# Patient Record
Sex: FEMALE | Race: WHITE | NOT HISPANIC OR LATINO | Employment: OTHER | URBAN - METROPOLITAN AREA
[De-identification: names, ages, dates, MRNs, and addresses within clinical notes are randomized per-mention and may not be internally consistent; named-entity substitution may affect disease eponyms.]

---

## 2019-02-22 ENCOUNTER — HOSPITAL ENCOUNTER (OUTPATIENT)
Dept: RADIOLOGY | Facility: MEDICAL CENTER | Age: 84
End: 2019-02-22

## 2019-02-22 ENCOUNTER — HOSPITAL ENCOUNTER (INPATIENT)
Facility: MEDICAL CENTER | Age: 84
LOS: 10 days | DRG: 083 | End: 2019-03-04
Attending: SURGERY | Admitting: SURGERY
Payer: MEDICARE

## 2019-02-22 ENCOUNTER — APPOINTMENT (OUTPATIENT)
Dept: RADIOLOGY | Facility: MEDICAL CENTER | Age: 84
DRG: 083 | End: 2019-02-22
Attending: NEUROLOGICAL SURGERY
Payer: MEDICARE

## 2019-02-22 DIAGNOSIS — I62.9 ICB (INTRACRANIAL BLEED) (HCC): ICD-10-CM

## 2019-02-22 DIAGNOSIS — Z79.01 CHRONIC ANTICOAGULATION: ICD-10-CM

## 2019-02-22 DIAGNOSIS — S01.01XA LACERATION OF SCALP, INITIAL ENCOUNTER: ICD-10-CM

## 2019-02-22 DIAGNOSIS — T14.90XA TRAUMA: ICD-10-CM

## 2019-02-22 DIAGNOSIS — S06.9X0A TRAUMATIC BRAIN INJURY, WITHOUT LOSS OF CONSCIOUSNESS, INITIAL ENCOUNTER (HCC): ICD-10-CM

## 2019-02-22 DIAGNOSIS — S12.101A CLOSED NONDISPLACED FRACTURE OF SECOND CERVICAL VERTEBRA, UNSPECIFIED FRACTURE MORPHOLOGY, INITIAL ENCOUNTER (HCC): ICD-10-CM

## 2019-02-22 PROBLEM — Z53.09 CONTRAINDICATION TO DEEP VEIN THROMBOSIS (DVT) PROPHYLAXIS: Status: ACTIVE | Noted: 2019-02-22

## 2019-02-22 PROBLEM — S12.100A FRACTURE OF SECOND CERVICAL VERTEBRA (HCC): Status: ACTIVE | Noted: 2019-02-22

## 2019-02-22 PROBLEM — S06.9XAA TRAUMATIC BRAIN INJURY (HCC): Status: ACTIVE | Noted: 2019-02-22

## 2019-02-22 PROBLEM — D69.59 PLATELET DYSFUNCTION DUE TO DRUGS: Status: ACTIVE | Noted: 2019-02-22

## 2019-02-22 PROBLEM — T50.905A PLATELET DYSFUNCTION DUE TO DRUGS: Status: ACTIVE | Noted: 2019-02-22

## 2019-02-22 LAB
ABO GROUP BLD: NORMAL
ALBUMIN SERPL BCP-MCNC: 3.6 G/DL (ref 3.2–4.9)
ALBUMIN/GLOB SERPL: 1.1 G/DL
ALP SERPL-CCNC: 59 U/L (ref 30–99)
ALT SERPL-CCNC: 7 U/L (ref 2–50)
ANION GAP SERPL CALC-SCNC: 9 MMOL/L (ref 0–11.9)
APTT PPP: 26.4 SEC (ref 24.7–36)
AST SERPL-CCNC: 23 U/L (ref 12–45)
BARCODED ABORH UBTYP: 5100
BARCODED PRD CODE UBPRD: NORMAL
BARCODED UNIT NUM UBUNT: NORMAL
BILIRUB SERPL-MCNC: 0.5 MG/DL (ref 0.1–1.5)
BLD GP AB SCN SERPL QL: NORMAL
BUN SERPL-MCNC: 18 MG/DL (ref 8–22)
CALCIUM SERPL-MCNC: 8.8 MG/DL (ref 8.5–10.5)
CFT BLD TEG: 3.8 MIN (ref 5–10)
CHLORIDE SERPL-SCNC: 109 MMOL/L (ref 96–112)
CLOT ANGLE BLD TEG: 71.8 DEGREES (ref 53–72)
CLOT LYSIS 30M P MA LENFR BLD TEG: 0 % (ref 0–8)
CO2 SERPL-SCNC: 23 MMOL/L (ref 20–33)
COMPONENT P 8504P: NORMAL
CREAT SERPL-MCNC: 1.01 MG/DL (ref 0.5–1.4)
CT.EXTRINSIC BLD ROTEM: 1.3 MIN (ref 1–3)
ERYTHROCYTE [DISTWIDTH] IN BLOOD BY AUTOMATED COUNT: 50.4 FL (ref 35.9–50)
ETHANOL BLD-MCNC: 0.01 G/DL
GLOBULIN SER CALC-MCNC: 3.2 G/DL (ref 1.9–3.5)
GLUCOSE SERPL-MCNC: 101 MG/DL (ref 65–99)
HCT VFR BLD AUTO: 39.9 % (ref 37–47)
HGB BLD-MCNC: 12.7 G/DL (ref 12–16)
INR PPP: 1.14 (ref 0.87–1.13)
MCF BLD TEG: 68.8 MM (ref 50–70)
MCH RBC QN AUTO: 30.7 PG (ref 27–33)
MCHC RBC AUTO-ENTMCNC: 31.8 G/DL (ref 33.6–35)
MCV RBC AUTO: 96.4 FL (ref 81.4–97.8)
PA AA BLD-ACNC: 100 %
PA ADP BLD-ACNC: 90.8 %
PLATELET # BLD AUTO: 134 K/UL (ref 164–446)
PMV BLD AUTO: 11.9 FL (ref 9–12.9)
POTASSIUM SERPL-SCNC: 3.5 MMOL/L (ref 3.6–5.5)
PRODUCT TYPE UPROD: NORMAL
PROT SERPL-MCNC: 6.8 G/DL (ref 6–8.2)
PROTHROMBIN TIME: 14.8 SEC (ref 12–14.6)
RBC # BLD AUTO: 4.14 M/UL (ref 4.2–5.4)
RH BLD: NORMAL
SODIUM SERPL-SCNC: 141 MMOL/L (ref 135–145)
TEG ALGORITHM TGALG: ABNORMAL
UNIT STATUS USTAT: NORMAL
WBC # BLD AUTO: 6.7 K/UL (ref 4.8–10.8)

## 2019-02-22 PROCEDURE — 86900 BLOOD TYPING SEROLOGIC ABO: CPT

## 2019-02-22 PROCEDURE — 96374 THER/PROPH/DIAG INJ IV PUSH: CPT

## 2019-02-22 PROCEDURE — 36430 TRANSFUSION BLD/BLD COMPNT: CPT

## 2019-02-22 PROCEDURE — 80307 DRUG TEST PRSMV CHEM ANLYZR: CPT

## 2019-02-22 PROCEDURE — G0390 TRAUMA RESPONS W/HOSP CRITI: HCPCS

## 2019-02-22 PROCEDURE — P9034 PLATELETS, PHERESIS: HCPCS

## 2019-02-22 PROCEDURE — 700111 HCHG RX REV CODE 636 W/ 250 OVERRIDE (IP): Performed by: NEUROLOGICAL SURGERY

## 2019-02-22 PROCEDURE — 700111 HCHG RX REV CODE 636 W/ 250 OVERRIDE (IP): Performed by: SURGERY

## 2019-02-22 PROCEDURE — 80053 COMPREHEN METABOLIC PANEL: CPT

## 2019-02-22 PROCEDURE — 99291 CRITICAL CARE FIRST HOUR: CPT

## 2019-02-22 PROCEDURE — 85610 PROTHROMBIN TIME: CPT

## 2019-02-22 PROCEDURE — 70450 CT HEAD/BRAIN W/O DYE: CPT

## 2019-02-22 PROCEDURE — 85027 COMPLETE CBC AUTOMATED: CPT

## 2019-02-22 PROCEDURE — 306560 TORSO SUPPORT,POSEY RESTR LG: Performed by: SURGERY

## 2019-02-22 PROCEDURE — 85384 FIBRINOGEN ACTIVITY: CPT

## 2019-02-22 PROCEDURE — 85576 BLOOD PLATELET AGGREGATION: CPT | Mod: 91

## 2019-02-22 PROCEDURE — 85730 THROMBOPLASTIN TIME PARTIAL: CPT

## 2019-02-22 PROCEDURE — 85347 COAGULATION TIME ACTIVATED: CPT

## 2019-02-22 PROCEDURE — 700105 HCHG RX REV CODE 258: Performed by: NEUROLOGICAL SURGERY

## 2019-02-22 PROCEDURE — 700105 HCHG RX REV CODE 258: Performed by: SURGERY

## 2019-02-22 PROCEDURE — 86850 RBC ANTIBODY SCREEN: CPT

## 2019-02-22 PROCEDURE — 86901 BLOOD TYPING SEROLOGIC RH(D): CPT

## 2019-02-22 PROCEDURE — 30233R1 TRANSFUSION OF NONAUTOLOGOUS PLATELETS INTO PERIPHERAL VEIN, PERCUTANEOUS APPROACH: ICD-10-PCS | Performed by: SURGERY

## 2019-02-22 PROCEDURE — 94760 N-INVAS EAR/PLS OXIMETRY 1: CPT

## 2019-02-22 PROCEDURE — 770022 HCHG ROOM/CARE - ICU (200)

## 2019-02-22 RX ORDER — POLYETHYLENE GLYCOL 3350 17 G/17G
1 POWDER, FOR SOLUTION ORAL 2 TIMES DAILY
Status: DISCONTINUED | OUTPATIENT
Start: 2019-02-22 | End: 2019-02-27

## 2019-02-22 RX ORDER — LORAZEPAM 2 MG/ML
1-2 INJECTION INTRAMUSCULAR
Status: DISCONTINUED | OUTPATIENT
Start: 2019-02-22 | End: 2019-02-23

## 2019-02-22 RX ORDER — ONDANSETRON 2 MG/ML
4 INJECTION INTRAMUSCULAR; INTRAVENOUS EVERY 4 HOURS PRN
Status: DISCONTINUED | OUTPATIENT
Start: 2019-02-22 | End: 2019-03-04 | Stop reason: HOSPADM

## 2019-02-22 RX ORDER — LORAZEPAM 2 MG/ML
INJECTION INTRAMUSCULAR
Status: COMPLETED | OUTPATIENT
Start: 2019-02-22 | End: 2019-02-22

## 2019-02-22 RX ORDER — ENEMA 19; 7 G/133ML; G/133ML
1 ENEMA RECTAL
Status: DISCONTINUED | OUTPATIENT
Start: 2019-02-22 | End: 2019-03-04 | Stop reason: HOSPADM

## 2019-02-22 RX ORDER — FAMOTIDINE 20 MG/1
20 TABLET, FILM COATED ORAL DAILY
Status: DISCONTINUED | OUTPATIENT
Start: 2019-02-23 | End: 2019-02-27

## 2019-02-22 RX ORDER — DOCUSATE SODIUM 100 MG/1
100 CAPSULE, LIQUID FILLED ORAL 2 TIMES DAILY
Status: DISCONTINUED | OUTPATIENT
Start: 2019-02-22 | End: 2019-02-26

## 2019-02-22 RX ORDER — BISACODYL 10 MG
10 SUPPOSITORY, RECTAL RECTAL
Status: DISCONTINUED | OUTPATIENT
Start: 2019-02-22 | End: 2019-03-04 | Stop reason: HOSPADM

## 2019-02-22 RX ORDER — AMOXICILLIN 250 MG
1 CAPSULE ORAL
Status: DISCONTINUED | OUTPATIENT
Start: 2019-02-22 | End: 2019-03-04 | Stop reason: HOSPADM

## 2019-02-22 RX ORDER — AMOXICILLIN 250 MG
1 CAPSULE ORAL NIGHTLY
Status: DISCONTINUED | OUTPATIENT
Start: 2019-02-22 | End: 2019-02-27

## 2019-02-22 RX ORDER — LORAZEPAM 2 MG/ML
3-4 INJECTION INTRAMUSCULAR
Status: DISCONTINUED | OUTPATIENT
Start: 2019-02-22 | End: 2019-02-23

## 2019-02-22 RX ORDER — MORPHINE SULFATE 4 MG/ML
4 INJECTION, SOLUTION INTRAMUSCULAR; INTRAVENOUS
Status: DISCONTINUED | OUTPATIENT
Start: 2019-02-22 | End: 2019-02-23

## 2019-02-22 RX ORDER — SODIUM CHLORIDE, SODIUM LACTATE, POTASSIUM CHLORIDE, CALCIUM CHLORIDE 600; 310; 30; 20 MG/100ML; MG/100ML; MG/100ML; MG/100ML
INJECTION, SOLUTION INTRAVENOUS CONTINUOUS
Status: DISCONTINUED | OUTPATIENT
Start: 2019-02-22 | End: 2019-02-25

## 2019-02-22 RX ADMIN — SODIUM CHLORIDE 500 MG: 9 INJECTION, SOLUTION INTRAVENOUS at 21:55

## 2019-02-22 RX ADMIN — LORAZEPAM 0.5 MG: 2 INJECTION INTRAMUSCULAR; INTRAVENOUS at 19:46

## 2019-02-22 RX ADMIN — SODIUM CHLORIDE, POTASSIUM CHLORIDE, SODIUM LACTATE AND CALCIUM CHLORIDE: 600; 310; 30; 20 INJECTION, SOLUTION INTRAVENOUS at 21:55

## 2019-02-22 RX ADMIN — LORAZEPAM 3 MG: 2 INJECTION INTRAMUSCULAR; INTRAVENOUS at 21:05

## 2019-02-23 ENCOUNTER — APPOINTMENT (OUTPATIENT)
Dept: RADIOLOGY | Facility: MEDICAL CENTER | Age: 84
DRG: 083 | End: 2019-02-23
Attending: NEUROLOGICAL SURGERY
Payer: MEDICARE

## 2019-02-23 PROBLEM — Z51.5 PALLIATIVE CARE ENCOUNTER: Status: ACTIVE | Noted: 2019-02-23

## 2019-02-23 PROBLEM — Z01.89 ENCOUNTER FOR GERIATRIC ASSESSMENT: Status: ACTIVE | Noted: 2019-02-23

## 2019-02-23 PROBLEM — Z82.0 FAMILY HISTORY OF ALZHEIMER'S DISEASE: Status: ACTIVE | Noted: 2019-02-23

## 2019-02-23 LAB
ABO GROUP BLD: NORMAL
ALBUMIN SERPL BCP-MCNC: 3.9 G/DL (ref 3.2–4.9)
ALBUMIN/GLOB SERPL: 1.7 G/DL
ALP SERPL-CCNC: 63 U/L (ref 30–99)
ALT SERPL-CCNC: 10 U/L (ref 2–50)
ANION GAP SERPL CALC-SCNC: 5 MMOL/L (ref 0–11.9)
AST SERPL-CCNC: 27 U/L (ref 12–45)
BASOPHILS # BLD AUTO: 0.4 % (ref 0–1.8)
BASOPHILS # BLD: 0.03 K/UL (ref 0–0.12)
BILIRUB SERPL-MCNC: 1.1 MG/DL (ref 0.1–1.5)
BUN SERPL-MCNC: 18 MG/DL (ref 8–22)
CALCIUM SERPL-MCNC: 9.7 MG/DL (ref 8.5–10.5)
CFT BLD TEG: 4 MIN (ref 5–10)
CHLORIDE SERPL-SCNC: 106 MMOL/L (ref 96–112)
CLOT ANGLE BLD TEG: 72.5 DEGREES (ref 53–72)
CLOT LYSIS 30M P MA LENFR BLD TEG: 7.1 % (ref 0–8)
CO2 SERPL-SCNC: 24 MMOL/L (ref 20–33)
CREAT SERPL-MCNC: 1.03 MG/DL (ref 0.5–1.4)
CT.EXTRINSIC BLD ROTEM: 1.2 MIN (ref 1–3)
EOSINOPHIL # BLD AUTO: 0 K/UL (ref 0–0.51)
EOSINOPHIL NFR BLD: 0 % (ref 0–6.9)
ERYTHROCYTE [DISTWIDTH] IN BLOOD BY AUTOMATED COUNT: 49.1 FL (ref 35.9–50)
GLOBULIN SER CALC-MCNC: 2.3 G/DL (ref 1.9–3.5)
GLUCOSE SERPL-MCNC: 117 MG/DL (ref 65–99)
HCT VFR BLD AUTO: 32.7 % (ref 37–47)
HGB BLD-MCNC: 10.7 G/DL (ref 12–16)
IMM GRANULOCYTES # BLD AUTO: 0.03 K/UL (ref 0–0.11)
IMM GRANULOCYTES NFR BLD AUTO: 0.4 % (ref 0–0.9)
LYMPHOCYTES # BLD AUTO: 1.48 K/UL (ref 1–4.8)
LYMPHOCYTES NFR BLD: 17.8 % (ref 22–41)
MCF BLD TEG: 67.9 MM (ref 50–70)
MCH RBC QN AUTO: 31.1 PG (ref 27–33)
MCHC RBC AUTO-ENTMCNC: 32.7 G/DL (ref 33.6–35)
MCV RBC AUTO: 95.1 FL (ref 81.4–97.8)
MONOCYTES # BLD AUTO: 0.96 K/UL (ref 0–0.85)
MONOCYTES NFR BLD AUTO: 11.5 % (ref 0–13.4)
NEUTROPHILS # BLD AUTO: 5.82 K/UL (ref 2–7.15)
NEUTROPHILS NFR BLD: 69.9 % (ref 44–72)
NRBC # BLD AUTO: 0 K/UL
NRBC BLD-RTO: 0 /100 WBC
PA AA BLD-ACNC: 88.8 %
PA ADP BLD-ACNC: 55.8 %
PLATELET # BLD AUTO: 150 K/UL (ref 164–446)
PMV BLD AUTO: 11.9 FL (ref 9–12.9)
POTASSIUM SERPL-SCNC: 3.7 MMOL/L (ref 3.6–5.5)
PROT SERPL-MCNC: 6.2 G/DL (ref 6–8.2)
RBC # BLD AUTO: 3.44 M/UL (ref 4.2–5.4)
RH BLD: NORMAL
SODIUM SERPL-SCNC: 135 MMOL/L (ref 135–145)
TEG ALGORITHM TGALG: ABNORMAL
WBC # BLD AUTO: 8.3 K/UL (ref 4.8–10.8)

## 2019-02-23 PROCEDURE — 85347 COAGULATION TIME ACTIVATED: CPT

## 2019-02-23 PROCEDURE — 770001 HCHG ROOM/CARE - MED/SURG/GYN PRIV*

## 2019-02-23 PROCEDURE — 90471 IMMUNIZATION ADMIN: CPT

## 2019-02-23 PROCEDURE — A9270 NON-COVERED ITEM OR SERVICE: HCPCS | Performed by: NURSE PRACTITIONER

## 2019-02-23 PROCEDURE — 700105 HCHG RX REV CODE 258: Performed by: NEUROLOGICAL SURGERY

## 2019-02-23 PROCEDURE — 90670 PCV13 VACCINE IM: CPT | Performed by: SURGERY

## 2019-02-23 PROCEDURE — 85025 COMPLETE CBC W/AUTO DIFF WBC: CPT

## 2019-02-23 PROCEDURE — 700105 HCHG RX REV CODE 258: Performed by: SURGERY

## 2019-02-23 PROCEDURE — 85384 FIBRINOGEN ACTIVITY: CPT

## 2019-02-23 PROCEDURE — 70450 CT HEAD/BRAIN W/O DYE: CPT

## 2019-02-23 PROCEDURE — 700111 HCHG RX REV CODE 636 W/ 250 OVERRIDE (IP): Performed by: NEUROLOGICAL SURGERY

## 2019-02-23 PROCEDURE — 700102 HCHG RX REV CODE 250 W/ 637 OVERRIDE(OP): Performed by: NURSE PRACTITIONER

## 2019-02-23 PROCEDURE — 700111 HCHG RX REV CODE 636 W/ 250 OVERRIDE (IP): Performed by: SURGERY

## 2019-02-23 PROCEDURE — 85576 BLOOD PLATELET AGGREGATION: CPT | Mod: 91

## 2019-02-23 PROCEDURE — 99233 SBSQ HOSP IP/OBS HIGH 50: CPT | Performed by: SURGERY

## 2019-02-23 PROCEDURE — 80053 COMPREHEN METABOLIC PANEL: CPT

## 2019-02-23 RX ORDER — ACETAMINOPHEN 325 MG/1
650 TABLET ORAL EVERY 4 HOURS PRN
Status: DISCONTINUED | OUTPATIENT
Start: 2019-02-23 | End: 2019-02-26

## 2019-02-23 RX ORDER — LOSARTAN POTASSIUM 50 MG/1
100 TABLET ORAL
Status: DISCONTINUED | OUTPATIENT
Start: 2019-02-23 | End: 2019-03-04 | Stop reason: HOSPADM

## 2019-02-23 RX ORDER — HYDROCHLOROTHIAZIDE 25 MG/1
12.5 TABLET ORAL
Status: DISCONTINUED | OUTPATIENT
Start: 2019-02-23 | End: 2019-03-04 | Stop reason: HOSPADM

## 2019-02-23 RX ORDER — LOSARTAN POTASSIUM AND HYDROCHLOROTHIAZIDE 12.5; 1 MG/1; MG/1
1 TABLET ORAL DAILY
Status: DISCONTINUED | OUTPATIENT
Start: 2019-02-23 | End: 2019-02-23

## 2019-02-23 RX ADMIN — FAMOTIDINE 20 MG: 10 INJECTION INTRAVENOUS at 06:08

## 2019-02-23 RX ADMIN — LOSARTAN POTASSIUM 100 MG: 50 TABLET ORAL at 14:33

## 2019-02-23 RX ADMIN — LORAZEPAM 2 MG: 2 INJECTION INTRAMUSCULAR; INTRAVENOUS at 04:03

## 2019-02-23 RX ADMIN — SODIUM CHLORIDE 500 MG: 9 INJECTION, SOLUTION INTRAVENOUS at 17:20

## 2019-02-23 RX ADMIN — SODIUM CHLORIDE 500 MG: 9 INJECTION, SOLUTION INTRAVENOUS at 06:11

## 2019-02-23 RX ADMIN — HYDROCHLOROTHIAZIDE 12.5 MG: 25 TABLET ORAL at 14:33

## 2019-02-23 RX ADMIN — SODIUM CHLORIDE, POTASSIUM CHLORIDE, SODIUM LACTATE AND CALCIUM CHLORIDE: 600; 310; 30; 20 INJECTION, SOLUTION INTRAVENOUS at 12:28

## 2019-02-23 RX ADMIN — PNEUMOCOCCAL 13-VALENT CONJUGATE VACCINE 0.5 ML: 2.2; 2.2; 2.2; 2.2; 2.2; 4.4; 2.2; 2.2; 2.2; 2.2; 2.2; 2.2; 2.2 INJECTION, SUSPENSION INTRAMUSCULAR at 17:21

## 2019-02-23 ASSESSMENT — COGNITIVE AND FUNCTIONAL STATUS - GENERAL
MOVING FROM LYING ON BACK TO SITTING ON SIDE OF FLAT BED: A LOT
MOBILITY SCORE: 13
DAILY ACTIVITIY SCORE: 12
TURNING FROM BACK TO SIDE WHILE IN FLAT BAD: A LITTLE
SUGGESTED CMS G CODE MODIFIER DAILY ACTIVITY: CL
DRESSING REGULAR LOWER BODY CLOTHING: A LOT
WALKING IN HOSPITAL ROOM: A LOT
MOVING TO AND FROM BED TO CHAIR: A LOT
DRESSING REGULAR UPPER BODY CLOTHING: A LOT
SUGGESTED CMS G CODE MODIFIER MOBILITY: CL
PERSONAL GROOMING: A LOT
STANDING UP FROM CHAIR USING ARMS: A LOT
EATING MEALS: A LOT
HELP NEEDED FOR BATHING: A LOT
TOILETING: A LOT
CLIMB 3 TO 5 STEPS WITH RAILING: A LOT

## 2019-02-23 ASSESSMENT — LIFESTYLE VARIABLES
EVER_SMOKED: NEVER
ALCOHOL_USE: NO

## 2019-02-23 NOTE — ASSESSMENT & PLAN NOTE
Ground level fall, unknown LOC.  Trauma Yellow Transfer Activation.  Smiley Collins MD. Trauma Surgery.

## 2019-02-23 NOTE — ASSESSMENT & PLAN NOTE
Nondisplaced oblique fracture mid dens of C2.  Non-operative management.   Aspen collar on at all times for 6 weeks.  Andrea Alejo MD. Neurosurgery.

## 2019-02-23 NOTE — CARE PLAN
Problem: Neuro Status  Goal: Monitor neuro status and rapid identification of neuro changes  Assessing patient neuro status per ordered, per family patient's baseline is poor due to Alzheimer's.

## 2019-02-23 NOTE — H&P
IDENTIFICATION:  An 87-year-old female.    HISTORY OF PRESENT ILLNESS:  The patient apparently lives at home with her   family and fell at home and had a mental status change.  She subsequently was   taken to the emergency room at [facility], she was found   to have a small subdural hematoma as well as a fracture through the base of   C2.  She was subsequently transferred to [facility] as a trauma   green.    PAST MEDICAL HISTORY:  Illnesses are hypertension and rheumatoid arthritis.    PAST SURGICAL HISTORY:  Hysterectomy.    MEDICATIONS:  Prolia, Atarax, Hyzaar and Plavix.    ALLERGIES:  TO SULFA.    SOCIAL HISTORY:  Unobtainable.    REVIEW OF SYSTEMS:  Unobtainable.    PHYSICAL EXAMINATION:  VITAL SIGNS:  Her blood pressure 160/70, heart rate in the 90s.  GENERAL:  She is alert, but not cooperative and is agitated.  HEENT:  Her head is without any evidence of obvious trauma.  A 2 cm laceration   on the right lateral scalp that has been closed with staples.  NECK:  In a C-collar.  Trachea is midline.  LUNGS:  Clear.  HEART:  Regular rate and rhythm.  ABDOMEN:  Soft.  PELVIS:  Stable.  EXTREMITIES:  Moving all extremities.  NEUROLOGIC:  She has a GCS of 13.    IMAGING:  Head CT demonstrated her to have a small subdural hematoma on the   left side.  She also has a C2 ____ fracture.    IMPRESSION:  An 87-year-old female with left subdural hematoma and a dens   fracture.    PLAN:  She will be admitted to the ICU.  Dr. Alejo from neurosurgery will   see her in regards to her cervical fracture as well as her subdural hematoma.    Since she is on Plavix, we will check a TEG and correct any coagulopathy that   may be noted.  She will be in a C collar and will do a followup head CT in   approximately 6 hours.  She will start on Keppra and will do serial neurologic   examinations.       ____________________________________     MD KENYA DICKERSON / LATASHA    DD:  02/22/2019  20:57:21  DT:  02/22/2019 23:55:49    D#:  [id number]  Job#:  [id number]

## 2019-02-23 NOTE — PROGRESS NOTES
2303: Called daughter Damon, left voicemail.  Called daughter Saundra and discussed consent for platlet transfusion. Saundra will call back once talking with oldest sibling Damon.  2308: 2 RN telephone consent recieved

## 2019-02-23 NOTE — ASSESSMENT & PLAN NOTE
Premorbid.  Lives with daughter.  Ambulates with walker, has fallen a couple of times due to unsteady gait, able to complete all self care activities.  Normalize sleep/wake cycles.

## 2019-02-23 NOTE — ASSESSMENT & PLAN NOTE
Systemic anticoagulation contraindicated secondary to elevated bleeding risk.  RAP score 11.  2/23 Surveillance venous duplex scanning ordered.  2/24 Trauma screening bilateral lower extremity venous duplex negative for above knee DVT.

## 2019-02-23 NOTE — ED PROVIDER NOTES
ED Provider Note    HPI: Patient is an 87-year-old female who presented to the emergency department by ambulance transfer February 22, 2019 at 7:23 PM with a chief complaint of a head injury.    Patient fell at home and hit her head.  She presented to another facility where CT imaging showed a nondisplaced C2 fracture as well as an intracranial bleed.  The other facility did not have the capability to care for this problem and the patient was transferred here.  Patient has significant dementia.  She really was not able to cooperate very much with history giving.  As best as I can determine she is at her baseline in terms of mental status.  No one has arrived yet to assist with history per    Review of Systems: Cannot be obtained due to presenting condition    Past medical/surgical history: Per transfer paperwork hypertension rheumatoid arthritis hysterectomy.  The patient is also on anticoagulation but I do not know why and the patient cannot tell me    Medications: Prolia Atarax Hyzaar Plavix    Allergies: Sulfa    Social History: Unknown cannot be obtained due to presenting condition      Physical exam: Constitutional: Elderly female somewhat agitated  Vital signs: Temperature 99.0 pulse 94 respirations 16 blood pressure 162/75 pulse oximetry 95 per  EYES: PERRL, EOMI, Conjunctivae and sclera normal, eyelids normal bilaterally.  Neck: Trachea midline. No cervical masses seen or palpated. Normal range of motion, supple. No meningeal signs elicited.  Cardiac: Regular rate and rhythm. S1-S2 present. No S3 or S4 present. No murmurs, rubs, or gallops heard. No edema or varicosities were seen.   Lungs: Clear to auscultation with good aeration throughout. No wheezes, rales, or rhonchi heard. Patient's chest wall moved symmetrically with each respiratory effort. Patient was not making use of accessory muscles of respiration in breathing.  Abdomen: Soft nontender to palpation. No rebound or guarding elicited. No  organomegaly identified. No pulsatile abdominal masses identified.   Musculoskeletal:  no  pain with palpitation or movement of muscle, bone or joint , no obvious musculoskeletal deformities identified.  Neurologic: alert and awake oriented x2.  Moves all four extremities independently, no gross focal abnormalities identified. Normal strength and motor.  Skin: There is a stapled scalp laceration approximately 1-1/2 inches long in the right high parietal area.  Psychiatric: Patient appeared to be anxious.  She was not obviously delusional or hallucinating.    Medical decision making: Patient met in the department by trauma services.    I reviewed the studies and the other facility.  The patient appears to have a nondisplaced fracture of C2 and a traumatic subdural versus small epidural hematoma    Neurosurgery consulted.  Patient admitted to the ICU.  Her long-term prognosis is guarded at best given her age and comorbidities and current treatment on chronic anticoagulant medicine.  I certainly considered to be critically ill as she has a significant likelihood of decompensation    Please note that I spent 32 minutes in direct care of this critically ill patient.  This time was spent in evaluating the patient, reviewing test results, discussion with consultants, and foramen medical record    1) intracranial bleed  2) C2 fracture, nondisplaced  3.)  Chronic anticoagulation  4.)  Scalp laceration, 1-1/2 inch, cleaned and suture repaired  5.)  Critical care greater than 30-minute

## 2019-02-23 NOTE — DISCHARGE PLANNING
Trauma Response    Referral: Trauma Yellow Response    Intervention: SW responded to trauma yellow.  Pt was BIB [facility] after fall in her home.  Pt was alert and talking upon arrival.  Pts name is Shirley Hearn (: 1931).  SW obtained the following pt information: Pt fell in her home and hit her head. Pt is a transfer from [facility].  SW obtained pt's child, Saundra Izaguirre, contact information and LSW left a voicemail requesting a return call. Pt being roomed in [facility].     Plan: LSW will remain available as needed.

## 2019-02-23 NOTE — ASSESSMENT & PLAN NOTE
Acute subdural vs epidural hemorrhage in the left parietal region with the greatest transverse diameter of 1.2cm causing mild local mass effect  Repeat interval CT imaging unchanged.  Non-operative management.   Post traumatic pharmacologic seizure prophylaxis for 1 week (2/28).  Andrea Alejo MD. Neurosurgery.

## 2019-02-23 NOTE — PROGRESS NOTES
"Trauma Progress Note     Briefly, this is a 87 y.o. female with a traumatic brain injury following trauma.    BP (!) 167/76   Pulse 96   Temp (!) 38.6 °C (101.5 °F) (Grande)   Resp (!) 34   Ht 1.6 m (5' 3\")   Wt 54.4 kg (120 lb)   SpO2 96%   BMI 21.26 kg/m²       Intake/Output Summary (Last 24 hours) at 02/23/19 0636  Last data filed at 02/23/19 0600   Gross per 24 hour   Intake          1239.25 ml   Output              850 ml   Net           389.25 ml       Lab Results   Component Value Date/Time    WBC 8.3 02/23/2019 05:53 AM    RBC 3.44 (L) 02/23/2019 05:53 AM    HEMOGLOBIN 10.7 (L) 02/23/2019 05:53 AM    HEMATOCRIT 32.7 (L) 02/23/2019 05:53 AM    MCV 95.1 02/23/2019 05:53 AM    MCH 31.1 02/23/2019 05:53 AM    MCHC 32.7 (L) 02/23/2019 05:53 AM    MPV 11.9 02/23/2019 05:53 AM    NEUTSPOLYS 69.90 02/23/2019 05:53 AM    LYMPHOCYTES 17.80 (L) 02/23/2019 05:53 AM    MONOCYTES 11.50 02/23/2019 05:53 AM    EOSINOPHILS 0.00 02/23/2019 05:53 AM    BASOPHILS 0.40 02/23/2019 05:53 AM        Lab Results   Component Value Date/Time    SODIUM 141 02/22/2019 07:44 PM    POTASSIUM 3.5 (L) 02/22/2019 07:44 PM    CHLORIDE 109 02/22/2019 07:44 PM    CO2 23 02/22/2019 07:44 PM    GLUCOSE 101 (H) 02/22/2019 07:44 PM    BUN 18 02/22/2019 07:44 PM    CREATININE 1.01 02/22/2019 07:44 PM    BUNCREATRAT 18 07/06/2015 10:38 AM        Lab Results   Component Value Date/Time    PROTHROMBTM 14.8 (H) 02/22/2019 07:44 PM    INR 1.14 (H) 02/22/2019 07:44 PM        Recent Labs      02/22/19   1944  02/23/19   0101   REACTMIN  3.8*  4.0*   CLOTKINET  1.3  1.2   CLOTANGL  71.8  72.5*   MAXCLOTS  68.8  67.9   [id number]  0.0  7.1   PRCINADP  90.8  55.8   PRCINAA  100.0  88.8       Assessment:  Sedated female, oriented x1 per bedside RN  Repeat TEG with 88.8% AA inhibition  Repeat interval CT imaging of the brain demonstrated no significant change or progression.    Additional Plans:  Hold further platelet transfusion due to stable CT        "

## 2019-02-23 NOTE — CARE PLAN
Problem: Communication  Goal: The ability to communicate needs accurately and effectively will improve  Outcome: PROGRESSING SLOWER THAN EXPECTED    Intervention: [place] patient and significant other/support system to call light to alert staff of needs  Family at bedside, reorient patient to event and environment      Problem: Safety  Goal: Will remain free from injury  Outcome: PROGRESSING AS EXPECTED  Turn q2h, oral care, assist with ADLs

## 2019-02-23 NOTE — CARE PLAN
Problem: Safety - Medical Restraint  Goal: Remains free of injury from restraints (Restraint for Interference with Medical Device)  INTERVENTIONS:  1. Determine that other, less restrictive measures have been tried or would not be effective before applying the restraint  2. Evaluate the patient's condition at the time of restraint application  3. Inform patient/family regarding the reason for restraint  4. Q2H: Monitor safety, psychosocial status, comfort, nutrition and hydration    Outcome: PROGRESSING AS EXPECTED

## 2019-02-23 NOTE — PROGRESS NOTES
Patient arrived to room S104 at approximately 2000 via gurney. Patient A/Ox0-1, anxious and restless. Wt. 60.8 kg, temp 98.7f    2 RN skin check was complete upon arrival to unit  - Left temporal scalp laceration, sutures in place and still bleeding.   - Tear in crease of botox  - Left ring finger knuckle, bruised and swollen (ring removed)   - small scattered bruising to arms and legs   mepilex applied to sacrum, q 2 hour turns, pillows in use for positioning, and all appropriate skin preventative measures in place

## 2019-02-23 NOTE — ASSESSMENT & PLAN NOTE
History of ASA and Plavix for TIA.  Admission TEG with platelet mapping demonstrated prolonged AA.  2/22 Transfused 1 unit platelets.  Repeat TEG with 88.8% AA inhibition. Repeat imaging unchanged so no further platelet transfusion.  Hold ASA and Plavix for 6 weeks per neurosurgery.

## 2019-02-23 NOTE — PROGRESS NOTES
Trauma / Surgical Daily Progress Note    Date of Service  2/23/2019    Chief Complaint  87 y.o. female admitted 2/22/2019 with Subdural hematoma (HCC)    Interval Events  Arouses to voice, not following, mumbled speech  Repeat head CT stable  Pt was living with daughter, some difficulty with ambulating but able to perform self care independently  Family will discuss code status    - Geriatrics consult placed  - Palliative care consult placed  - PT/OT  - Diet when awake  - Medically cleared for transfer to Neuro    Review of Systems  Review of Systems   Unable to perform ROS: Mental acuity        Vital Signs  Temp:  [37.2 °C (98.9 °F)-38.6 °C (101.5 °F)] 38.6 °C (101.5 °F)  Pulse:  [] 71  Resp:  [14-95] 14  BP: (162-181)/(75-86) 167/76  SpO2:  [90 %-99 %] 96 %    Physical Exam  Physical Exam   Constitutional: She appears well-developed. She appears lethargic. No distress. Cervical collar in place.   Elderly   HENT:   Head: Normocephalic.   Eyes: Pupils are equal, round, and reactive to light.   Neck: Neck supple.   Cardiovascular: Normal rate, regular rhythm and normal heart sounds.    No murmur heard.  Pulmonary/Chest: Effort normal and breath sounds normal. No respiratory distress. She exhibits no tenderness.   Abdominal: Soft. Bowel sounds are normal. She exhibits no distension. There is no tenderness. There is no guarding.   Neurological: She appears lethargic. GCS eye subscore is 3. GCS verbal subscore is 2. GCS motor subscore is 5.   Skin: Skin is warm and dry. There is pallor.   Nursing note and vitals reviewed.      Laboratory  Recent Results (from the past 24 hour(s))   DIAGNOSTIC ALCOHOL    Collection Time: 02/22/19  7:44 PM   Result Value Ref Range    Diagnostic Alcohol 0.01 (H) 0.00 g/dL   CBC WITHOUT DIFFERENTIAL    Collection Time: 02/22/19  7:44 PM   Result Value Ref Range    WBC 6.7 4.8 - 10.8 K/uL    RBC 4.14 (L) 4.20 - 5.40 M/uL    Hemoglobin 12.7 12.0 - 16.0 g/dL    Hematocrit 39.9 37.0 -  47.0 %    MCV 96.4 81.4 - 97.8 fL    MCH 30.7 27.0 - 33.0 pg    MCHC 31.8 (L) 33.6 - 35.0 g/dL    RDW 50.4 (H) 35.9 - 50.0 fL    Platelet Count 134 (L) 164 - 446 K/uL    MPV 11.9 9.0 - 12.9 fL   Comp Metabolic Panel    Collection Time: 02/22/19  7:44 PM   Result Value Ref Range    Sodium 141 135 - 145 mmol/L    Potassium 3.5 (L) 3.6 - 5.5 mmol/L    Chloride 109 96 - 112 mmol/L    Co2 23 20 - 33 mmol/L    Anion Gap 9.0 0.0 - 11.9    Glucose 101 (H) 65 - 99 mg/dL    Bun 18 8 - 22 mg/dL    Creatinine 1.01 0.50 - 1.40 mg/dL    Calcium 8.8 8.5 - 10.5 mg/dL    AST(SGOT) 23 12 - 45 U/L    ALT(SGPT) 7 2 - 50 U/L    Alkaline Phosphatase 59 30 - 99 U/L    Total Bilirubin 0.5 0.1 - 1.5 mg/dL    Albumin 3.6 3.2 - 4.9 g/dL    Total Protein 6.8 6.0 - 8.2 g/dL    Globulin 3.2 1.9 - 3.5 g/dL    A-G Ratio 1.1 g/dL   Prothrombin Time    Collection Time: 02/22/19  7:44 PM   Result Value Ref Range    PT 14.8 (H) 12.0 - 14.6 sec    INR 1.14 (H) 0.87 - 1.13   APTT    Collection Time: 02/22/19  7:44 PM   Result Value Ref Range    APTT 26.4 24.7 - 36.0 sec   PLATELET MAPPING WITH BASIC TEG    Collection Time: 02/22/19  7:44 PM   Result Value Ref Range    Reaction Time Initial-R 3.8 (L) 5.0 - 10.0 min    Clot Kinetics-K 1.3 1.0 - 3.0 min    Clot Angle-Angle 71.8 53.0 - 72.0 degrees    Maximum Clot Strength-MA 68.8 50.0 - 70.0 mm    Lysis 30 minutes-LY30 0.0 0.0 - 8.0 %    % Inhibition ADP 90.8 %    % Inhibition .0 %    TEG Algorithm Link Algorithm    COD - Adult (Type and Screen)    Collection Time: 02/22/19  7:44 PM   Result Value Ref Range    ABO Grouping Only A     Rh Grouping Only POS     Antibody Screen-Cod NEG    ESTIMATED GFR    Collection Time: 02/22/19  7:44 PM   Result Value Ref Range    GFR If African American >60 >60 mL/min/1.73 m 2    GFR If Non African American 52 (A) >60 mL/min/1.73 m 2   PLATELETS REQUEST    Collection Time: 02/22/19 10:28 PM   Result Value Ref Range    Component P       P2                   Plts,Pheresis       [id number]   transfused   02/22/19   23:19      Product Type Platelets  Pheresis LR     Dispense Status Transfused     Unit Number (Barcoded) [id number]     Product Code (Barcoded) [id number]     Blood Type (Barcoded) 5100    PLATELET MAPPING WITH BASIC TEG    Collection Time: 02/23/19  1:01 AM   Result Value Ref Range    Reaction Time Initial-R 4.0 (L) 5.0 - 10.0 min    Clot Kinetics-K 1.2 1.0 - 3.0 min    Clot Angle-Angle 72.5 (H) 53.0 - 72.0 degrees    Maximum Clot Strength-MA 67.9 50.0 - 70.0 mm    Lysis 30 minutes-LY30 7.1 0.0 - 8.0 %    % Inhibition ADP 55.8 %    % Inhibition AA 88.8 %    TEG Algorithm Link Algorithm    ABO AND RH CONFIRMATION    Collection Time: 02/23/19  5:53 AM   Result Value Ref Range    ABO Confirm A     Second Rh Group POS    CBC with Differential: Tomorrow AM    Collection Time: 02/23/19  5:53 AM   Result Value Ref Range    WBC 8.3 4.8 - 10.8 K/uL    RBC 3.44 (L) 4.20 - 5.40 M/uL    Hemoglobin 10.7 (L) 12.0 - 16.0 g/dL    Hematocrit 32.7 (L) 37.0 - 47.0 %    MCV 95.1 81.4 - 97.8 fL    MCH 31.1 27.0 - 33.0 pg    MCHC 32.7 (L) 33.6 - 35.0 g/dL    RDW 49.1 35.9 - 50.0 fL    Platelet Count 150 (L) 164 - 446 K/uL    MPV 11.9 9.0 - 12.9 fL    Neutrophils-Polys 69.90 44.00 - 72.00 %    Lymphocytes 17.80 (L) 22.00 - 41.00 %    Monocytes 11.50 0.00 - 13.40 %    Eosinophils 0.00 0.00 - 6.90 %    Basophils 0.40 0.00 - 1.80 %    Immature Granulocytes 0.40 0.00 - 0.90 %    Nucleated RBC 0.00 /100 WBC    Neutrophils (Absolute) 5.82 2.00 - 7.15 K/uL    Lymphs (Absolute) 1.48 1.00 - 4.80 K/uL    Monos (Absolute) 0.96 (H) 0.00 - 0.85 K/uL    Eos (Absolute) 0.00 0.00 - 0.51 K/uL    Baso (Absolute) 0.03 0.00 - 0.12 K/uL    Immature Granulocytes (abs) 0.03 0.00 - 0.11 K/uL    NRBC (Absolute) 0.00 K/uL   Comp Metabolic Panel (CMP): Tomorrow AM    Collection Time: 02/23/19  5:53 AM   Result Value Ref Range    Sodium 135 135 - 145 mmol/L    Potassium 3.7 3.6 - 5.5 mmol/L    Chloride  106 96 - 112 mmol/L    Co2 24 20 - 33 mmol/L    Anion Gap 5.0 0.0 - 11.9    Glucose 117 (H) 65 - 99 mg/dL    Bun 18 8 - 22 mg/dL    Creatinine 1.03 0.50 - 1.40 mg/dL    Calcium 9.7 8.5 - 10.5 mg/dL    AST(SGOT) 27 12 - 45 U/L    ALT(SGPT) 10 2 - 50 U/L    Alkaline Phosphatase 63 30 - 99 U/L    Total Bilirubin 1.1 0.1 - 1.5 mg/dL    Albumin 3.9 3.2 - 4.9 g/dL    Total Protein 6.2 6.0 - 8.2 g/dL    Globulin 2.3 1.9 - 3.5 g/dL    A-G Ratio 1.7 g/dL   ESTIMATED GFR    Collection Time: 02/23/19  5:53 AM   Result Value Ref Range    GFR If African American >60 >60 mL/min/1.73 m 2    GFR If Non  51 (A) >60 mL/min/1.73 m 2       Fluids    Intake/Output Summary (Last 24 hours) at 02/23/19 1146  Last data filed at 02/23/19 1000   Gross per 24 hour   Intake          1539.25 ml   Output             1075 ml   Net           464.25 ml       Core Measures & Quality Metrics  Labs reviewed, Medications reviewed and Radiology images reviewed  Grande catheter: Critically Ill - Requiring Accurate Measurement of Urinary Output      DVT Prophylaxis: Contraindicated - High bleeding risk  DVT prophylaxis - mechanical: SCDs  Ulcer prophylaxis: Yes        Total Score: 11    REASON NOT GIVEN: Mental acuity.  ETOH Screening     Intervention complete date: 2/23/2019        Assessment/Plan  Palliative care encounter- (present on admission)   Assessment & Plan    2/23 Palliative care consult for advance care planning and POLST completion.     Encounter for geriatric assessment- (present on admission)   Assessment & Plan    2/23 Geriatric consult placed.     Traumatic brain injury (HCC)- (present on admission)   Assessment & Plan    Acute subdural vs epidural hemorrhage in the left parietal region with the greatest transverse diameter of 1.2cm causing mild local mass effect  Repeat interval CT imaging unchanged.  Non-operative management.   Post traumatic pharmacologic seizure prophylaxis for 1 week.  Andrea Alejo MD.  Neurosurgery.     Fracture of second cervical vertebra (HCC)- (present on admission)   Assessment & Plan    Nondisplaced oblique fracture mid dens of C2.  Non-operative management.   Aspen collar on at all times for 6 weeks.  Andrea Alejo MD. Neurosurgery.     Family history of Alzheimer's disease- (present on admission)   Assessment & Plan    Premorbid.  Lives with daughter.  Ambulates with walker, has fallen a couple of times due to unsteady gait, able to complete all self care activities.     Contraindication to deep vein thrombosis (DVT) prophylaxis- (present on admission)   Assessment & Plan    Systemic anticoagulation contraindicated secondary to elevated bleeding risk.  RAP score 11.  2/23 Surveillance venous duplex scanning ordered.     Platelet dysfunction due to drugs- (present on admission)   Assessment & Plan    History of ASA and Plavix for TIA.  Admission TEG with platelet mapping demonstrated prolonged AA.  2/22 Transfused 1 unit platelets.  Repeat TEG with 88.8% AA inhibition. Repeat imaging unchanged so no further platelet transfusion.  Hold ASA and Plavix for 6 weeks per neurosurgery.     Scalp laceration- (present on admission)   Assessment & Plan    Right scalp laceration.  Stapled repair completed in ED.  Plan for staple removal at 7 days (3/1).     Trauma- (present on admission)   Assessment & Plan    Ground level fall, unknown LOC.  Trauma Yellow Transfer Activation.  Smiley Collins MD. Trauma Surgery.     Essential hypertension- (present on admission)   Assessment & Plan    Chronic condition treated with Hyzaar.  2/23 Resumed maintenance medication.         Discussed patient condition with Family, RN, Patient and trauma surgery. Dr. Lopez

## 2019-02-23 NOTE — PROGRESS NOTES
"Trauma Progress Note     Briefly, this is a 87 y.o. female with a traumatic brain injury following a ground level fall.    BP (!) 181/86   Pulse (!) 129   Temp 37.2 °C (99 °F) (Temporal)   Resp (!) 41   Ht 1.6 m (5' 3\")   Wt 54.4 kg (120 lb)   SpO2 94%   BMI 21.26 kg/m²       Intake/Output Summary (Last 24 hours) at 02/22/19 2157  Last data filed at 02/22/19 1946   Gross per 24 hour   Intake                0 ml   Output                0 ml   Net                0 ml       Lab Results   Component Value Date/Time    WBC 6.7 02/22/2019 07:44 PM    RBC 4.14 (L) 02/22/2019 07:44 PM    HEMOGLOBIN 12.7 02/22/2019 07:44 PM    HEMATOCRIT 39.9 02/22/2019 07:44 PM    MCV 96.4 02/22/2019 07:44 PM    MCH 30.7 02/22/2019 07:44 PM    MCHC 31.8 (L) 02/22/2019 07:44 PM    MPV 11.9 02/22/2019 07:44 PM    NEUTSPOLYS 35.8 (L) 12/11/2010 05:35 AM    LYMPHOCYTES 45.8 (H) 12/11/2010 05:35 AM    MONOCYTES 11.5 (H) 12/11/2010 05:35 AM    EOSINOPHILS 6.4 (H) 12/11/2010 05:35 AM    BASOPHILS 0.7 12/11/2010 05:35 AM        Lab Results   Component Value Date/Time    SODIUM 141 02/22/2019 07:44 PM    POTASSIUM 3.5 (L) 02/22/2019 07:44 PM    CHLORIDE 109 02/22/2019 07:44 PM    CO2 23 02/22/2019 07:44 PM    GLUCOSE 101 (H) 02/22/2019 07:44 PM    BUN 18 02/22/2019 07:44 PM    CREATININE 1.01 02/22/2019 07:44 PM    BUNCREATRAT 18 07/06/2015 10:38 AM        Lab Results   Component Value Date/Time    PROTHROMBTM 14.8 (H) 02/22/2019 07:44 PM    INR 1.14 (H) 02/22/2019 07:44 PM        Recent Labs      02/22/19 1944   REACTMIN  3.8*   CLOTKINET  1.3   CLOTANGL  71.8   MAXCLOTS  68.8   [id number]  0.0   PRCINADP  90.8   PRCINAA  100.0       Assessment:  Elderly female, minimal verbal response with recent administration of Ativan per bedside nurse  Oriented x1  Moves all extremities     Additional Plans:  TEG with significant AA inhibition - will receive 1 unit platelets with a repeat TEG pending  Repeat CT of head completed, order placed for second " head CT 2/23 am per Dr. Alejo  Continue serial neurologic monitoring

## 2019-02-23 NOTE — PROGRESS NOTES
Follow up ct approximately stable.  ?? Slightly larger  Will order follow up CT for am  Minimize unnecessary sedation  .

## 2019-02-23 NOTE — PROGRESS NOTES
Neurosurgery Progress Note    Subjective:  Hospital day 2, fall at home, left subdural hematoma, C2 fracture on Plavix.  Follow-up CTs have been stable  Patient got Ativan for her CT this morning and is more lethargic.  Per nursing, restlessness did improve overnight    Exam:  Currently lethargic but arousable, moving all extremities  Noncommunicative this morning  Pupils are 2 mm and briskly reactive bilaterally  Extraocular movements intact    BP  Min: 162/75  Max: 181/86  Pulse  Av.6  Min: 74  Max: 144  Resp  Av.3  Min: 16  Max: 95  Temp  Av.7 °C (99.9 °F)  Min: 37.2 °C (98.9 °F)  Max: 38.6 °C (101.5 °F)  Monitored Temp 2  Av.4 °C (99.4 °F)  Min: 36.4 °C (97.5 °F)  Max: 38.6 °C (101.5 °F)  SpO2  Av.2 %  Min: 90 %  Max: 99 %    No Data Recorded    Recent Labs      19   0553   WBC  6.7  8.3   RBC  4.14*  3.44*   HEMOGLOBIN  12.7  10.7*   HEMATOCRIT  39.9  32.7*   MCV  96.4  95.1   MCH  30.7  31.1   MCHC  31.8*  32.7*   RDW  50.4*  49.1   PLATELETCT  134*  150*   MPV  11.9  11.9     Recent Labs      19   0553   SODIUM  141  135   POTASSIUM  3.5*  3.7   CHLORIDE  109  106   CO2  23  24   GLUCOSE  101*  117*   BUN  18  18   CREATININE  1.01  1.03   CALCIUM  8.8  9.7     Recent Labs      19   APTT  26.4   INR  1.14*     Recent Labs      19   0101   REACTMIN  3.8*  4.0*   CLOTKINET  1.3  1.2   CLOTANGL  71.8  72.5*   MAXCLOTS  68.8  67.9   UIO37CKV  0.0  7.1   PRCINADP  90.8  55.8   PRCINAA  100.0  88.8       Intake/Output       19 0700 - 19 0659 02/700 - 19 Total  Total       Intake    I.V.  --  1106.3 1106.3  150  -- 150    Pre-Hospital Volume -- 0 0 -- -- --    Trauma Resuscitation Volume -- 0 0 -- -- --    IV Volume (IV fluids) -- 500 500 -- -- --    Volume (mL) (LR infusion) -- 606.3 606.3 150 -- 150    Blood  --  33 33  --  -- --     PRBC Total Volume (Non-Barcoded) -- 0 0 -- -- --    FFP Total Volume (Non-Barcoded) -- 0 0 -- -- --    Platelets Total Volume (Non-Barcoded) -- 0 0 -- -- --    Cryoprecipitate (Pooled) Total Volume (Non-Barcoded) -- 0 0 -- -- --    Cryoprecipitate (Single) Total Volume (Non-Barcoded) -- 0 0 -- -- --    Volume (RELEASE PLATELET PHERESIS) -- 33 33 -- -- --    IV Piggyback  --  100 100  --  -- --    Volume (mL) (levETIRAcetam (KEPPRA) 500 mg in  mL IVPB) -- 100 100 -- -- --    Total Intake -- 1239.3 1239.3 150 -- 150       Output    Urine  --  850 850  100  -- 100    Output (mL) (Urethral Catheter Latex;Temperature probe 16 Fr.) -- 850 850 100 -- 100    Other  --  0 0  --  -- --    Pre-Hospital Output -- 0 0 -- -- --    Trauma Resuscitation Output -- 0 0 -- -- --    Stool  --  -- --  --  -- --    Number of Times Stooled -- -- -- 0 x -- 0 x    Blood  --  0 0  --  -- --    Est. Blood Loss (mL) -- 0 0 -- -- --    Total Output -- 850 850 100 -- 100       Net I/O     -- 389.3 389.3 50 -- 50            Intake/Output Summary (Last 24 hours) at 02/23/19 0814  Last data filed at 02/23/19 0800   Gross per 24 hour   Intake          1389.25 ml   Output              950 ml   Net           439.25 ml            • levETIRAcetam (KEPPRA) IV  500 mg Q12HRS   • Respiratory Care per Protocol   Continuous RT   • Pharmacy Consult Request  1 Each PHARMACY TO DOSE   • docusate sodium  100 mg BID   • senna-docusate  1 Tab Nightly   • senna-docusate  1 Tab Q24HRS PRN   • polyethylene glycol/lytes  1 Packet BID   • magnesium hydroxide  30 mL DAILY   • bisacodyl  10 mg Q24HRS PRN   • fleet  1 Each Once PRN   • LR   Continuous   • morphine injection  4 mg Q3HRS PRN   • LORazepam  1-2 mg Q HOUR PRN    Or   • LORazepam  3-4 mg Q HOUR PRN   • famotidine  20 mg DAILY    Or   • famotidine  20 mg DAILY   • ondansetron  4 mg Q4HRS PRN       Assessment and Plan:  Hospital day #2    Subdural pzbjqwbr-[id number] CTs have been stable.  It seems that she  may have been on Plavix for a TIA.  This should discontinued for 6 weeks.  Family does not wish for any neurosurgical intervention.  As such, I will sign off but I am happy to see her again if there are any concerns    Type II dens fracture-This is quite well aligned on the CT.  We currently have her in a cervical collar, Aspen.  I would like for her to wear this for 6 weeks.    Prefer to hold pharmacologic DVT prophylaxis given the fact that she is not an operative candidate and she does have a subdural.    Continue Keppra for 7 days total    OK with every 2 neuro checks while in the ICU.  Okay with transfer to floor when approved by trauma.  Today is fine

## 2019-02-23 NOTE — CONSULTS
Date of service: 2/22/2019    Requesting physician: Dr Collins    Reason for consultation  Left acute subdural hematoma  C2, dens, type II fracture, nondisplaced    HPI  This is an 87-year-old female who is apparently on Plavix who sustained a fall at home and was initially seen at [facility] and had a CT scan which showed a small lucency through the base of C2, dens as well as a small left convexity extra-axial hemorrhage likely subdural.  She is currently quite confused and a history is not possible given her mentation    Review of systems  Unable to be obtained due to mental status    Social history  Unable to be obtained due to mental status    Past surgical history  Unable to be obtained due to mental status    Past medical history  Unable to be obtained due to mental status    Home medications  Unable to be obtained due to mental status    Allergies  Sulfa    Physical exam  Vital signs: AVSS  General: Moderate distress, confused.  Two-point, upper extremity restraints  HEENT: Dried blood about the right parietal boss with small laceration, rigid cervical collar in place  Skin: Dry, numerous ecchymoses and abrasions  Neurologic: GCS 14, very confused, A&O x1 moving all extremities well.  No focal deficits are able to be noted.  Sensation appears to be intact in all 4 extremities    Labs  Reviewed in epic  Awaiting TEG results    Imaging  I reviewed her head CT and cervical spine CT from [facility].  There is a small, noncompressive left acute subdural hematoma.  There appears to be some mixed density in this suggesting it could be hyper acute.  CT of the cervical spine shows evidence of a C2 pannus consistent with rheumatoid arthritis.  There is a small lucency through the base of the dens at C2 suggesting an acute C2, type II dens fracture.  This is nondisplaced.    Assessment  87-year-old female reportedly on Plavix, acute left subdural hematoma  Nondisplaced type II dens  fracture    Plan  1.  Admit to ICU with close monitoring  2.  Is there is some suggestion on her CT that the bleed is hyperacute and she is on Plavix, I have ordered her a CT which will be completed approximately 4 hours after her initial CT.  3.  Aspirin collar has been placed.  I would like her to wear this at all times  4.  If take shows any coagulopathy, she will need to be transfused with platelets.  If there is expansion of the subdural hematoma she will need platelets as well.  5.  Keppra 500 mg every 12 hours times 7 days  6.  We will continue to follow    I spent a total of 53 minutes on history, physical examination, review of her imaging and treatment planning

## 2019-02-23 NOTE — PROGRESS NOTES
2 RN skin check completed, generalized bruising. large bruise behind right ear, laceration on right side of head, swelling and bruising to left ring finger. Photos taken and uploaded to epic

## 2019-02-23 NOTE — ASSESSMENT & PLAN NOTE
Right scalp laceration.  Stapled repair completed in ED.  Plan for staple removal at 7 days (3/1).

## 2019-02-23 NOTE — DISCHARGE PLANNING
Medical Social Work    LSW performed NOK search for pt and LSW called Erik Hearn in [place], [place] who states she is not related to pt. LSW called [phone] for a Maximo Izaguirre and LSW spoke to Liberty who reports that Maximo is her son and Maximo's wife is Saundra Izaguirre who is pt's dtr. Liberty provided number of [phone] which is the same number we have on pt's facesheet for Saundra Izaguirre. LSW called and left another voicemail for Saundra.    LSW will continue to follow.

## 2019-02-24 ENCOUNTER — APPOINTMENT (OUTPATIENT)
Dept: RADIOLOGY | Facility: MEDICAL CENTER | Age: 84
DRG: 083 | End: 2019-02-24
Attending: NURSE PRACTITIONER
Payer: MEDICARE

## 2019-02-24 LAB
ANION GAP SERPL CALC-SCNC: 8 MMOL/L (ref 0–11.9)
BASOPHILS # BLD AUTO: 0.3 % (ref 0–1.8)
BASOPHILS # BLD: 0.03 K/UL (ref 0–0.12)
BUN SERPL-MCNC: 15 MG/DL (ref 8–22)
CALCIUM SERPL-MCNC: 10.4 MG/DL (ref 8.5–10.5)
CHLORIDE SERPL-SCNC: 104 MMOL/L (ref 96–112)
CO2 SERPL-SCNC: 26 MMOL/L (ref 20–33)
CREAT SERPL-MCNC: 0.96 MG/DL (ref 0.5–1.4)
EOSINOPHIL # BLD AUTO: 0 K/UL (ref 0–0.51)
EOSINOPHIL NFR BLD: 0 % (ref 0–6.9)
ERYTHROCYTE [DISTWIDTH] IN BLOOD BY AUTOMATED COUNT: 47.7 FL (ref 35.9–50)
GLUCOSE SERPL-MCNC: 119 MG/DL (ref 65–99)
HCT VFR BLD AUTO: 37.9 % (ref 37–47)
HGB BLD-MCNC: 12.4 G/DL (ref 12–16)
IMM GRANULOCYTES # BLD AUTO: 0.03 K/UL (ref 0–0.11)
IMM GRANULOCYTES NFR BLD AUTO: 0.3 % (ref 0–0.9)
LYMPHOCYTES # BLD AUTO: 1.6 K/UL (ref 1–4.8)
LYMPHOCYTES NFR BLD: 17.7 % (ref 22–41)
MCH RBC QN AUTO: 30.8 PG (ref 27–33)
MCHC RBC AUTO-ENTMCNC: 32.7 G/DL (ref 33.6–35)
MCV RBC AUTO: 94 FL (ref 81.4–97.8)
MONOCYTES # BLD AUTO: 1.09 K/UL (ref 0–0.85)
MONOCYTES NFR BLD AUTO: 12.1 % (ref 0–13.4)
NEUTROPHILS # BLD AUTO: 6.29 K/UL (ref 2–7.15)
NEUTROPHILS NFR BLD: 69.6 % (ref 44–72)
NRBC # BLD AUTO: 0 K/UL
NRBC BLD-RTO: 0 /100 WBC
PLATELET # BLD AUTO: 156 K/UL (ref 164–446)
PMV BLD AUTO: 11.8 FL (ref 9–12.9)
POTASSIUM SERPL-SCNC: 3.3 MMOL/L (ref 3.6–5.5)
RBC # BLD AUTO: 4.03 M/UL (ref 4.2–5.4)
SODIUM SERPL-SCNC: 138 MMOL/L (ref 135–145)
WBC # BLD AUTO: 9 K/UL (ref 4.8–10.8)

## 2019-02-24 PROCEDURE — A9270 NON-COVERED ITEM OR SERVICE: HCPCS | Performed by: SURGERY

## 2019-02-24 PROCEDURE — 770001 HCHG ROOM/CARE - MED/SURG/GYN PRIV*

## 2019-02-24 PROCEDURE — 700102 HCHG RX REV CODE 250 W/ 637 OVERRIDE(OP): Performed by: SURGERY

## 2019-02-24 PROCEDURE — 700111 HCHG RX REV CODE 636 W/ 250 OVERRIDE (IP): Performed by: SURGERY

## 2019-02-24 PROCEDURE — 700105 HCHG RX REV CODE 258: Performed by: NEUROLOGICAL SURGERY

## 2019-02-24 PROCEDURE — 93970 EXTREMITY STUDY: CPT

## 2019-02-24 PROCEDURE — A9270 NON-COVERED ITEM OR SERVICE: HCPCS | Performed by: NURSE PRACTITIONER

## 2019-02-24 PROCEDURE — 700111 HCHG RX REV CODE 636 W/ 250 OVERRIDE (IP): Performed by: NEUROLOGICAL SURGERY

## 2019-02-24 PROCEDURE — 80048 BASIC METABOLIC PNL TOTAL CA: CPT

## 2019-02-24 PROCEDURE — 99233 SBSQ HOSP IP/OBS HIGH 50: CPT | Performed by: SURGERY

## 2019-02-24 PROCEDURE — 85025 COMPLETE CBC W/AUTO DIFF WBC: CPT

## 2019-02-24 PROCEDURE — 700102 HCHG RX REV CODE 250 W/ 637 OVERRIDE(OP): Performed by: NURSE PRACTITIONER

## 2019-02-24 RX ORDER — CALCITONIN SALMON 200 [IU]/.09ML
1 SPRAY, METERED NASAL DAILY
COMMUNITY

## 2019-02-24 RX ORDER — ESCITALOPRAM OXALATE 10 MG/1
10 TABLET ORAL DAILY
COMMUNITY

## 2019-02-24 RX ORDER — POTASSIUM CHLORIDE 20 MEQ/1
20 TABLET, EXTENDED RELEASE ORAL 2 TIMES DAILY
Status: DISPENSED | OUTPATIENT
Start: 2019-02-24 | End: 2019-02-26

## 2019-02-24 RX ORDER — MEMANTINE HYDROCHLORIDE 5 MG/1
5 TABLET ORAL DAILY
COMMUNITY

## 2019-02-24 RX ADMIN — SENNOSIDES-DOCUSATE SODIUM TAB 8.6-50 MG 1 TABLET: 8.6-5 TAB at 15:29

## 2019-02-24 RX ADMIN — FAMOTIDINE 20 MG: 10 INJECTION INTRAVENOUS at 05:34

## 2019-02-24 RX ADMIN — BISACODYL 10 MG: 10 SUPPOSITORY RECTAL at 18:34

## 2019-02-24 RX ADMIN — POLYETHYLENE GLYCOL 3350 1 PACKET: 17 POWDER, FOR SOLUTION ORAL at 15:29

## 2019-02-24 RX ADMIN — LOSARTAN POTASSIUM 100 MG: 50 TABLET ORAL at 05:26

## 2019-02-24 RX ADMIN — SODIUM CHLORIDE 500 MG: 9 INJECTION, SOLUTION INTRAVENOUS at 05:42

## 2019-02-24 RX ADMIN — POTASSIUM CHLORIDE 20 MEQ: 1500 TABLET, EXTENDED RELEASE ORAL at 09:33

## 2019-02-24 RX ADMIN — SODIUM CHLORIDE 500 MG: 9 INJECTION, SOLUTION INTRAVENOUS at 17:41

## 2019-02-24 RX ADMIN — HYDROCHLOROTHIAZIDE 12.5 MG: 25 TABLET ORAL at 05:25

## 2019-02-24 ASSESSMENT — PATIENT HEALTH QUESTIONNAIRE - PHQ9
SUM OF ALL RESPONSES TO PHQ9 QUESTIONS 1 AND 2: 0
1. LITTLE INTEREST OR PLEASURE IN DOING THINGS: NOT AT ALL
2. FEELING DOWN, DEPRESSED, IRRITABLE, OR HOPELESS: NOT AT ALL
SUM OF ALL RESPONSES TO PHQ9 QUESTIONS 1 AND 2: 0
1. LITTLE INTEREST OR PLEASURE IN DOING THINGS: NOT AT ALL
2. FEELING DOWN, DEPRESSED, IRRITABLE, OR HOPELESS: NOT AT ALL

## 2019-02-24 NOTE — CARE PLAN
Problem: Skin Integrity  Goal: Risk for impaired skin integrity will decrease  Outcome: PROGRESSING AS EXPECTED  2 RN skin check. Pillows for support, and repositioning. Pt is very restless and moves a ton. Mepilex in place on sacrum.

## 2019-02-24 NOTE — PROGRESS NOTES
Med rec completed per [facility] pharmacy.   Comments: Per pharmacy, pt was taking losartan-HCTZ 50/12.5 mg once daily, however it was DC'd since pt's daughter mentioned pt was no longer taking. Pharmacist notified.

## 2019-02-24 NOTE — CARE PLAN
Problem: Mobility  Goal: Risk for activity intolerance will decrease    Intervention: Encourage patient to increase activity level in collaboration with Interdisciplinary Team  Patient up to chair for al meals, encouraging patient to stay awake, and watch tv. Lights on. Bed alarm in place.

## 2019-02-24 NOTE — PROGRESS NOTES
"Trauma / Surgical Daily Progress Note    Date of Service  2/24/2019    Chief Complaint  87 y.o. female admitted 2/22/2019 with Subdural hematoma (HCC)    Interval Events  Remains in SICU awaiting baker bed  Arouses easier this morning, said \"no\" clearly when asked about pain, moving all extremities, not following    - Normalize sleep/wake cycles, lights on during the day, rest and quiet at night  - Trauma duplex pending  - Palliative and Geriatric consults pending  - SNF referral placed  - Remains medically cleared for transfer to Neuro/Neurosurg    Review of Systems  Review of Systems   Unable to perform ROS: Mental acuity        Vital Signs  Temp:  [36.9 °C (98.4 °F)-37.4 °C (99.3 °F)] 37 °C (98.6 °F)  Pulse:  [53-97] 80  Resp:  [12-51] 24  SpO2:  [88 %-98 %] 94 %    Physical Exam  Physical Exam   Constitutional: She appears well-developed. She appears lethargic. No distress. Cervical collar in place.   Elderly   HENT:   Head: Normocephalic.   Neck: Neck supple.   Cardiovascular: Normal rate.    Pulmonary/Chest: Effort normal. No respiratory distress.   Abdominal: Soft. Bowel sounds are normal.   Musculoskeletal:   Moves all extremities   Neurological: She appears lethargic. GCS eye subscore is 3. GCS verbal subscore is 3. GCS motor subscore is 5.   Skin: Skin is warm and dry. There is pallor.   Nursing note and vitals reviewed.      Laboratory  Recent Results (from the past 24 hour(s))   CBC WITH DIFFERENTIAL    Collection Time: 02/24/19  4:49 AM   Result Value Ref Range    WBC 9.0 4.8 - 10.8 K/uL    RBC 4.03 (L) 4.20 - 5.40 M/uL    Hemoglobin 12.4 12.0 - 16.0 g/dL    Hematocrit 37.9 37.0 - 47.0 %    MCV 94.0 81.4 - 97.8 fL    MCH 30.8 27.0 - 33.0 pg    MCHC 32.7 (L) 33.6 - 35.0 g/dL    RDW 47.7 35.9 - 50.0 fL    Platelet Count 156 (L) 164 - 446 K/uL    MPV 11.8 9.0 - 12.9 fL    Neutrophils-Polys 69.60 44.00 - 72.00 %    Lymphocytes 17.70 (L) 22.00 - 41.00 %    Monocytes 12.10 0.00 - 13.40 %    Eosinophils 0.00 " 0.00 - 6.90 %    Basophils 0.30 0.00 - 1.80 %    Immature Granulocytes 0.30 0.00 - 0.90 %    Nucleated RBC 0.00 /100 WBC    Neutrophils (Absolute) 6.29 2.00 - 7.15 K/uL    Lymphs (Absolute) 1.60 1.00 - 4.80 K/uL    Monos (Absolute) 1.09 (H) 0.00 - 0.85 K/uL    Eos (Absolute) 0.00 0.00 - 0.51 K/uL    Baso (Absolute) 0.03 0.00 - 0.12 K/uL    Immature Granulocytes (abs) 0.03 0.00 - 0.11 K/uL    NRBC (Absolute) 0.00 K/uL   Basic Metabolic Panel    Collection Time: 02/24/19  4:49 AM   Result Value Ref Range    Sodium 138 135 - 145 mmol/L    Potassium 3.3 (L) 3.6 - 5.5 mmol/L    Chloride 104 96 - 112 mmol/L    Co2 26 20 - 33 mmol/L    Glucose 119 (H) 65 - 99 mg/dL    Bun 15 8 - 22 mg/dL    Creatinine 0.96 0.50 - 1.40 mg/dL    Calcium 10.4 8.5 - 10.5 mg/dL    Anion Gap 8.0 0.0 - 11.9   ESTIMATED GFR    Collection Time: 02/24/19  4:49 AM   Result Value Ref Range    GFR If African American >60 >60 mL/min/1.73 m 2    GFR If Non African American 55 (A) >60 mL/min/1.73 m 2       Fluids    Intake/Output Summary (Last 24 hours) at 02/24/19 0927  Last data filed at 02/24/19 0600   Gross per 24 hour   Intake             1750 ml   Output             1850 ml   Net             -100 ml       Core Measures & Quality Metrics  Labs reviewed, Medications reviewed and Radiology images reviewed  Grande Catheter: Continue until more alert.      DVT Prophylaxis: Contraindicated - High bleeding risk  DVT prophylaxis - mechanical: SCDs  Ulcer prophylaxis: Yes    Assessed for rehab: Patient unable to tolerate rehabilitation therapeutic regimen    Total Score: 11    REASON NOT GIVEN: Mental acuity.  ETOH Screening     Intervention complete date: 2/23/2019        Assessment/Plan  Palliative care encounter- (present on admission)   Assessment & Plan    2/23 Palliative care consult for advance care planning and POLST completion.     Encounter for geriatric assessment- (present on admission)   Assessment & Plan    2/23 Geriatric consult  placed.  Normalize sleep/wake cycles.     Traumatic brain injury (HCC)- (present on admission)   Assessment & Plan    Acute subdural vs epidural hemorrhage in the left parietal region with the greatest transverse diameter of 1.2cm causing mild local mass effect  Repeat interval CT imaging unchanged.  Non-operative management.   Post traumatic pharmacologic seizure prophylaxis for 1 week.  Andrea Alejo MD. Neurosurgery.     Fracture of second cervical vertebra (HCC)- (present on admission)   Assessment & Plan    Nondisplaced oblique fracture mid dens of C2.  Non-operative management.   Aspen collar on at all times for 6 weeks.  Andrea Alejo MD. Neurosurgery.     Family history of Alzheimer's disease- (present on admission)   Assessment & Plan    Premorbid.  Lives with daughter.  Ambulates with walker, has fallen a couple of times due to unsteady gait, able to complete all self care activities.  Normalize sleep/wake cycles.     Contraindication to deep vein thrombosis (DVT) prophylaxis- (present on admission)   Assessment & Plan    Systemic anticoagulation contraindicated secondary to elevated bleeding risk.  RAP score 11.  2/23 Surveillance venous duplex scanning ordered.  2/24 Duplex pending.     Platelet dysfunction due to drugs- (present on admission)   Assessment & Plan    History of ASA and Plavix for TIA.  Admission TEG with platelet mapping demonstrated prolonged AA.  2/22 Transfused 1 unit platelets.  Repeat TEG with 88.8% AA inhibition. Repeat imaging unchanged so no further platelet transfusion.  Hold ASA and Plavix for 6 weeks per neurosurgery.     Scalp laceration- (present on admission)   Assessment & Plan    Right scalp laceration.  Stapled repair completed in ED.  Plan for staple removal at 7 days (3/1).     Trauma- (present on admission)   Assessment & Plan    Ground level fall, unknown LOC.  Trauma Yellow Transfer Activation.  Smiley Collins MD. Trauma Surgery.     Essential hypertension- (present  on admission)   Assessment & Plan    Chronic condition treated with Hyzaar.  2/23 Resumed maintenance medication.         Discussed patient condition with RN, Patient and trauma surgery. Dr. Lopez

## 2019-02-25 PROBLEM — E87.6 HYPOKALEMIA: Status: ACTIVE | Noted: 2019-02-25

## 2019-02-25 LAB
ANION GAP SERPL CALC-SCNC: 11 MMOL/L (ref 0–11.9)
ANION GAP SERPL CALC-SCNC: 12 MMOL/L (ref 0–11.9)
ANION GAP SERPL CALC-SCNC: 12 MMOL/L (ref 0–11.9)
BASOPHILS # BLD AUTO: 0.2 % (ref 0–1.8)
BASOPHILS # BLD: 0.02 K/UL (ref 0–0.12)
BUN SERPL-MCNC: 14 MG/DL (ref 8–22)
BUN SERPL-MCNC: 15 MG/DL (ref 8–22)
BUN SERPL-MCNC: 15 MG/DL (ref 8–22)
CALCIUM SERPL-MCNC: 9.3 MG/DL (ref 8.5–10.5)
CALCIUM SERPL-MCNC: 9.4 MG/DL (ref 8.5–10.5)
CALCIUM SERPL-MCNC: 9.9 MG/DL (ref 8.5–10.5)
CHLORIDE SERPL-SCNC: 102 MMOL/L (ref 96–112)
CHLORIDE SERPL-SCNC: 102 MMOL/L (ref 96–112)
CHLORIDE SERPL-SCNC: 103 MMOL/L (ref 96–112)
CO2 SERPL-SCNC: 25 MMOL/L (ref 20–33)
CO2 SERPL-SCNC: 26 MMOL/L (ref 20–33)
CO2 SERPL-SCNC: 26 MMOL/L (ref 20–33)
CREAT SERPL-MCNC: 0.88 MG/DL (ref 0.5–1.4)
CREAT SERPL-MCNC: 0.88 MG/DL (ref 0.5–1.4)
CREAT SERPL-MCNC: 0.96 MG/DL (ref 0.5–1.4)
EOSINOPHIL # BLD AUTO: 0 K/UL (ref 0–0.51)
EOSINOPHIL NFR BLD: 0 % (ref 0–6.9)
ERYTHROCYTE [DISTWIDTH] IN BLOOD BY AUTOMATED COUNT: 46.2 FL (ref 35.9–50)
GLUCOSE SERPL-MCNC: 127 MG/DL (ref 65–99)
GLUCOSE SERPL-MCNC: 128 MG/DL (ref 65–99)
GLUCOSE SERPL-MCNC: 140 MG/DL (ref 65–99)
HCT VFR BLD AUTO: 33.4 % (ref 37–47)
HGB BLD-MCNC: 11.5 G/DL (ref 12–16)
IMM GRANULOCYTES # BLD AUTO: 0.03 K/UL (ref 0–0.11)
IMM GRANULOCYTES NFR BLD AUTO: 0.3 % (ref 0–0.9)
LYMPHOCYTES # BLD AUTO: 1.21 K/UL (ref 1–4.8)
LYMPHOCYTES NFR BLD: 11.3 % (ref 22–41)
MAGNESIUM SERPL-MCNC: 1.8 MG/DL (ref 1.5–2.5)
MCH RBC QN AUTO: 31.6 PG (ref 27–33)
MCHC RBC AUTO-ENTMCNC: 34.4 G/DL (ref 33.6–35)
MCV RBC AUTO: 91.8 FL (ref 81.4–97.8)
MONOCYTES # BLD AUTO: 1.31 K/UL (ref 0–0.85)
MONOCYTES NFR BLD AUTO: 12.3 % (ref 0–13.4)
NEUTROPHILS # BLD AUTO: 8.1 K/UL (ref 2–7.15)
NEUTROPHILS NFR BLD: 75.9 % (ref 44–72)
NRBC # BLD AUTO: 0 K/UL
NRBC BLD-RTO: 0 /100 WBC
PHOSPHATE SERPL-MCNC: 2.2 MG/DL (ref 2.5–4.5)
PLATELET # BLD AUTO: 137 K/UL (ref 164–446)
PMV BLD AUTO: 12.4 FL (ref 9–12.9)
POTASSIUM SERPL-SCNC: 2.5 MMOL/L (ref 3.6–5.5)
POTASSIUM SERPL-SCNC: 2.6 MMOL/L (ref 3.6–5.5)
POTASSIUM SERPL-SCNC: 3.3 MMOL/L (ref 3.6–5.5)
RBC # BLD AUTO: 3.64 M/UL (ref 4.2–5.4)
SODIUM SERPL-SCNC: 139 MMOL/L (ref 135–145)
SODIUM SERPL-SCNC: 140 MMOL/L (ref 135–145)
SODIUM SERPL-SCNC: 140 MMOL/L (ref 135–145)
WBC # BLD AUTO: 10.7 K/UL (ref 4.8–10.8)

## 2019-02-25 PROCEDURE — 700105 HCHG RX REV CODE 258: Performed by: NEUROLOGICAL SURGERY

## 2019-02-25 PROCEDURE — 700101 HCHG RX REV CODE 250: Performed by: NURSE PRACTITIONER

## 2019-02-25 PROCEDURE — A9270 NON-COVERED ITEM OR SERVICE: HCPCS | Performed by: SURGERY

## 2019-02-25 PROCEDURE — 700111 HCHG RX REV CODE 636 W/ 250 OVERRIDE (IP): Performed by: SURGERY

## 2019-02-25 PROCEDURE — 700111 HCHG RX REV CODE 636 W/ 250 OVERRIDE (IP): Performed by: NEUROLOGICAL SURGERY

## 2019-02-25 PROCEDURE — 700102 HCHG RX REV CODE 250 W/ 637 OVERRIDE(OP): Performed by: NURSE PRACTITIONER

## 2019-02-25 PROCEDURE — 700102 HCHG RX REV CODE 250 W/ 637 OVERRIDE(OP): Performed by: SURGERY

## 2019-02-25 PROCEDURE — A9270 NON-COVERED ITEM OR SERVICE: HCPCS | Performed by: NURSE PRACTITIONER

## 2019-02-25 PROCEDURE — 80048 BASIC METABOLIC PNL TOTAL CA: CPT

## 2019-02-25 PROCEDURE — 97166 OT EVAL MOD COMPLEX 45 MIN: CPT

## 2019-02-25 PROCEDURE — 36415 COLL VENOUS BLD VENIPUNCTURE: CPT

## 2019-02-25 PROCEDURE — 83735 ASSAY OF MAGNESIUM: CPT

## 2019-02-25 PROCEDURE — 97161 PT EVAL LOW COMPLEX 20 MIN: CPT

## 2019-02-25 PROCEDURE — 99222 1ST HOSP IP/OBS MODERATE 55: CPT | Performed by: INTERNAL MEDICINE

## 2019-02-25 PROCEDURE — 700111 HCHG RX REV CODE 636 W/ 250 OVERRIDE (IP): Performed by: NURSE PRACTITIONER

## 2019-02-25 PROCEDURE — 85025 COMPLETE CBC W/AUTO DIFF WBC: CPT

## 2019-02-25 PROCEDURE — 770001 HCHG ROOM/CARE - MED/SURG/GYN PRIV*

## 2019-02-25 PROCEDURE — 700105 HCHG RX REV CODE 258: Performed by: NURSE PRACTITIONER

## 2019-02-25 PROCEDURE — 92610 EVALUATE SWALLOWING FUNCTION: CPT

## 2019-02-25 PROCEDURE — 84100 ASSAY OF PHOSPHORUS: CPT

## 2019-02-25 PROCEDURE — 700105 HCHG RX REV CODE 258

## 2019-02-25 RX ORDER — SODIUM CHLORIDE AND POTASSIUM CHLORIDE 300; 900 MG/100ML; MG/100ML
INJECTION, SOLUTION INTRAVENOUS CONTINUOUS
Status: DISCONTINUED | OUTPATIENT
Start: 2019-02-25 | End: 2019-02-26

## 2019-02-25 RX ORDER — ACETAMINOPHEN 500 MG
1000 TABLET ORAL 3 TIMES DAILY
Status: DISCONTINUED | OUTPATIENT
Start: 2019-02-25 | End: 2019-03-04 | Stop reason: HOSPADM

## 2019-02-25 RX ORDER — SODIUM CHLORIDE 9 MG/ML
INJECTION, SOLUTION INTRAVENOUS
Status: COMPLETED
Start: 2019-02-25 | End: 2019-02-25

## 2019-02-25 RX ORDER — MAGNESIUM SULFATE HEPTAHYDRATE 40 MG/ML
2 INJECTION, SOLUTION INTRAVENOUS ONCE
Status: COMPLETED | OUTPATIENT
Start: 2019-02-25 | End: 2019-02-25

## 2019-02-25 RX ORDER — POTASSIUM CHLORIDE 7.45 MG/ML
10 INJECTION INTRAVENOUS
Status: COMPLETED | OUTPATIENT
Start: 2019-02-25 | End: 2019-02-25

## 2019-02-25 RX ORDER — ACETAMINOPHEN 650 MG/1
650 SUPPOSITORY RECTAL EVERY 6 HOURS PRN
Status: DISCONTINUED | OUTPATIENT
Start: 2019-02-25 | End: 2019-03-04 | Stop reason: HOSPADM

## 2019-02-25 RX ADMIN — HYDROCHLOROTHIAZIDE 12.5 MG: 25 TABLET ORAL at 17:53

## 2019-02-25 RX ADMIN — POTASSIUM CHLORIDE 10 MEQ: 7.46 INJECTION, SOLUTION INTRAVENOUS at 05:39

## 2019-02-25 RX ADMIN — POTASSIUM PHOSPHATE, MONOBASIC AND POTASSIUM PHOSPHATE, DIBASIC 30 MMOL: 224; 236 INJECTION, SOLUTION INTRAVENOUS at 10:42

## 2019-02-25 RX ADMIN — ACETAMINOPHEN 1000 MG: 500 TABLET ORAL at 17:27

## 2019-02-25 RX ADMIN — POTASSIUM CHLORIDE 10 MEQ: 7.46 INJECTION, SOLUTION INTRAVENOUS at 09:48

## 2019-02-25 RX ADMIN — SODIUM CHLORIDE 500 MG: 9 INJECTION, SOLUTION INTRAVENOUS at 05:05

## 2019-02-25 RX ADMIN — POTASSIUM CHLORIDE 10 MEQ: 7.46 INJECTION, SOLUTION INTRAVENOUS at 08:46

## 2019-02-25 RX ADMIN — SODIUM CHLORIDE 500 MG: 9 INJECTION, SOLUTION INTRAVENOUS at 17:48

## 2019-02-25 RX ADMIN — LOSARTAN POTASSIUM 100 MG: 50 TABLET ORAL at 17:53

## 2019-02-25 RX ADMIN — POTASSIUM CHLORIDE 20 MEQ: 1500 TABLET, EXTENDED RELEASE ORAL at 17:28

## 2019-02-25 RX ADMIN — ACETAMINOPHEN 650 MG: 650 SUPPOSITORY RECTAL at 12:12

## 2019-02-25 RX ADMIN — POTASSIUM CHLORIDE AND SODIUM CHLORIDE: 900; 300 INJECTION, SOLUTION INTRAVENOUS at 12:28

## 2019-02-25 RX ADMIN — MAGNESIUM SULFATE 2 G: 2 INJECTION INTRAVENOUS at 04:58

## 2019-02-25 RX ADMIN — POTASSIUM CHLORIDE 10 MEQ: 7.46 INJECTION, SOLUTION INTRAVENOUS at 09:19

## 2019-02-25 RX ADMIN — FAMOTIDINE 20 MG: 10 INJECTION INTRAVENOUS at 05:01

## 2019-02-25 RX ADMIN — SODIUM CHLORIDE 500 ML: 9 INJECTION, SOLUTION INTRAVENOUS at 04:58

## 2019-02-25 ASSESSMENT — COGNITIVE AND FUNCTIONAL STATUS - GENERAL
TURNING FROM BACK TO SIDE WHILE IN FLAT BAD: A LOT
TOILETING: A LOT
HELP NEEDED FOR BATHING: A LOT
CLIMB 3 TO 5 STEPS WITH RAILING: TOTAL
WALKING IN HOSPITAL ROOM: TOTAL
MOBILITY SCORE: 10
STANDING UP FROM CHAIR USING ARMS: A LOT
DAILY ACTIVITIY SCORE: 11
MOVING TO AND FROM BED TO CHAIR: A LOT
SUGGESTED CMS G CODE MODIFIER DAILY ACTIVITY: CL
DRESSING REGULAR UPPER BODY CLOTHING: A LOT
MOVING FROM LYING ON BACK TO SITTING ON SIDE OF FLAT BED: A LOT
PERSONAL GROOMING: A LOT
EATING MEALS: TOTAL
DRESSING REGULAR LOWER BODY CLOTHING: A LOT
SUGGESTED CMS G CODE MODIFIER MOBILITY: CL

## 2019-02-25 ASSESSMENT — GAIT ASSESSMENTS: GAIT LEVEL OF ASSIST: UNABLE TO PARTICIPATE

## 2019-02-25 NOTE — DISCHARGE PLANNING
Anticipated Discharge Disposition:   SNF    Action:   Met with family.   They would like pt to go to SNF first for STR and then they would like to take pt home.   Choice made for [facility], [facility] and [facility].   Choice faxed to [facility].    Barriers to Discharge:  Pending acceptance by SNF    Plan:   Follow up with [facility].

## 2019-02-25 NOTE — PROGRESS NOTES
"/93   Pulse 90   Temp 37.4 °C (99.3 °F) (Temporal)   Resp 20   Ht 1.6 m (5' 3\")   Wt 59.2 kg (130 lb 8.2 oz)   SpO2 97%   Breastfeeding? No   BMI 23.12 kg/m²     Called by nursing for potassium of 2.5  No recent vomiting or diarrhea per bedside nurse  Received one dose of oral replacement 2/24    Patient examined, no acute distress  Pulse 90, regular  IV potassium ordered  Magnesium and phosphorus replacement ordered  Recheck BMP prior to administration of Kphos  Nursing to place second IV    Discussed with pharmacy  "

## 2019-02-25 NOTE — CONSULTS
[facility] Consult.   Patient Name: Shirley Hearn  Patient Age, Gender: 87 y.o., female  Date of Consult:2/25/2019        Name of Requesting Consultant(s): Smiley Collins M.D.  Consultant: Alan Patel M.D.  Reason for Consult: Fall     Chief Complaint: Transfer from outside facility due to GLF.      History of Present Illness:  Shirley is a 87 y.o. female with a past medical history of moderate dementia with baseline dependence and IADLs and bathing, recurrent falls and osteoporosis.  History is provided by medical chart medical staff and family.  Patient is unable to provide a history.  Upon speaking with the family patient appears to have had of a mechanical fall that was witnessed by her daughter, which she lives with.  When the patient fell she hit her head was initially unresponsive.  The daughter is not completely sure of the events that fall, she called EMS and there was lots of different things going on.  She says the patient has episodes where she falls and remains doing fine and then all of a sudden completely falls down is out of it and confused for a while then goes back to normal.  She says she has had multiple MRIs of the brain as well as what sounds like an EEG without etiology to her issues. They say these falls occur about once a month and have been going on for over a year.     At baseline the family tells me that the patient does not like to be and is otherwise independent in her ADLs except for using a walker.  She is normally just oriented to self and not oriented to situation, place and time.  She is usually very talkative and the fact that she is not is abnormal for her.  Patient normally ambulates with a walker, and most of her falls are due to the fact that she is not using a walker, per family report, despite what they said earlier about her occasionally just losing consciousness.      The patient is followed by neurology at [facility].  They remarked that he previously  trialed donepezil which did cause some behavior issues.  They then transitioned of amantadine if not noticed any deleterious nor beneficial effects from his medication.  Patient is on medication for depression and is doing well on that per patient family report.     Patient enjoys shopping.  Palliative care is finishing up the visit just after entered the room in which she signed a family filled out a POLST dressing we should be able to allow the patient have a natural death, with limited interventions and no tube feeding.     The patient denied any pain this morning however her body posture grimacing suggest she was likely in pain.     Level of issues include persistent hypokalemia, and the patient was given 3 mg IV Ativan on admission.     Patient had issues diarrhea prior to admit     Activity Level:   Sedentary                      Activities of Daily Living:   Total assist     This is not a reflection of patient's normal baseline which is more independent.     ROS: Unable to obtain meaningful review of systems due to patient's mental status     Past Medical History        Past Medical History:   Diagnosis Date   • Hypertension     • Rheumatoid arthritis(714.0)              Current Facility-Administered Medications:   •  potassium phosphates 30 mmol in D5W 500 mL ivpb, 30 mmol, Intravenous, Once, Courtney Guthrie, A.P.R.N., Last Rate: 83.3 mL/hr at 02/25/19 1042, 30 mmol at 02/25/19 1042  •  0.9 % NaCl with KCl 40 mEq 1,000 mL, , Intravenous, Continuous, Leah Mosquera, A.P.N., Last Rate: 75 mL/hr at 02/25/19 1228  •  acetaminophen (TYLENOL) tablet 1,000 mg, 1,000 mg, Oral, TID, Leah Mosquera, A.P.N., Stopped at 02/25/19 1200  •  acetaminophen (TYLENOL) suppository 650 mg, 650 mg, Rectal, Q6HRS PRN, Leah Mosquera, A.P.N., 650 mg at 02/25/19 1212  •  potassium chloride SA (Kdur) tablet 20 mEq, 20 mEq, Oral, BID, Froilan Lopez M.D., 20 mEq at 02/24/19 0933  •  losartan (COZAAR) tablet 100 mg, 100 mg,  Oral, Q DAY, 100 mg at 02/24/19 0526 **AND** hydroCHLOROthiazide (HYDRODIURIL) tablet 12.5 mg, 12.5 mg, Oral, Q DAY, Vanda Demarco, A.P.R.N., 12.5 mg at 02/24/19 0525  •  acetaminophen (TYLENOL) tablet 650 mg, 650 mg, Oral, Q4HRS PRN, Vanda Demarco, A.P.R.N.  •  levETIRAcetam (KEPPRA) 500 mg in  mL IVPB, 500 mg, Intravenous, Q12HRS, Andrea Alejo M.D., Stopped at 02/25/19 0520  •  Respiratory Care per Protocol, , Nebulization, Continuous RT, Smiley Collins M.D.  •  Pharmacy Consult Request ...Pain Management Review 1 Each, 1 Each, Other, PHARMACY TO DOSE, Smiley Collins M.D.  •  docusate sodium (COLACE) capsule 100 mg, 100 mg, Oral, BID, Smiley Collins M.D., Stopped at 02/22/19 2015  •  senna-docusate (PERICOLACE or SENOKOT S) 8.6-50 MG per tablet 1 Tab, 1 Tab, Oral, Nightly, Smiley Collins M.D., Stopped at 02/22/19 2100  •  senna-docusate (PERICOLACE or SENOKOT S) 8.6-50 MG per tablet 1 Tab, 1 Tab, Oral, Q24HRS PRN, Smiley Collins M.D., 1 Tab at 02/24/19 1529  •  polyethylene glycol/lytes (MIRALAX) PACKET 1 Packet, 1 Packet, Oral, BID, Smiley Collins M.D., Stopped at 02/24/19 1800  •  magnesium hydroxide (MILK OF MAGNESIA) suspension 30 mL, 30 mL, Oral, DAILY, Smiley Collins M.D., Stopped at 02/23/19 0600  •  bisacodyl (DULCOLAX) suppository 10 mg, 10 mg, Rectal, Q24HRS PRN, Smiley Collins M.D., 10 mg at 02/24/19 1834  •  fleet enema 133 mL, 1 Each, Rectal, Once PRN, Smiley Collins M.D.  •  famotidine (PEPCID) tablet 20 mg, 20 mg, Oral, DAILY **OR** famotidine (PEPCID) injection 20 mg, 20 mg, Intravenous, DAILY, Smiley Collins M.D., 20 mg at 02/25/19 0501  •  ondansetron (ZOFRAN) syringe/vial injection 4 mg, 4 mg, Intravenous, Q4HRS PRN, Smiley Collins M.D.  Social History   Social History            Social History   • Marital status:        Spouse name: N/A   • Number of children: N/A   • Years of education: N/A          Occupational History   • Not on file.             Social History Main  "Topics   • Smoking status: Former Smoker   • Smokeless tobacco: Not on file         Comment: quit \"years ago\"   • Alcohol use No         Comment: occasional   • Drug use: No   • Sexual activity: Not on file           Other Topics Concern   • Not on file          Social History Narrative   • No narrative on file         Family History         Family History   Problem Relation Age of Onset   • Other Mother     • Other Father           Past Surgical History   Past Surgical History:   Procedure Laterality Date   • HYSTERECTOMY RADICAL       • OTHER ORTHOPEDIC SURGERY         bilat knees   • TONSILLECTOMY                   Physical Exam:  BP (!) 176/83   Pulse (!) 102   Temp 36.5 °C (97.7 °F) (Temporal)   Resp 15   Ht 1.6 m (5' 3\")   Wt 59.2 kg (130 lb 8.2 oz)   SpO2 98%   Breastfeeding? No   BMI 23.12 kg/m²   General: No acute distress, laying in bed, drowsy  Eyes: Bilateral blepharitis - treated with a warm, wet towel during the exam  Neck, hard c-collar in place  Neuro: Able to move all 4 extremities to command  Abdomen: Soft nontender nondistended   : Grande in place  Extremities: Peripheral IVs, vascular arthritic changes noted to hands bilaterally  Lungs: Clear to auscultation anteriorly, normal effort  Cardiovascular: Regular rate and rhythm 2+ radial pulses bilaterally 1+ DP pulses bilateral  Delirium Screen: Positive based on the mid observation that the patient is acutely different than baseline     Labs: CBC reviewed, anemia noted, CMP reviewed, noted hypokalemia normal magnesium, mild AK I on admission  Imaging: CT head showing left-sided subdural hematoma with small vessel disease, outside imaging shows C2 vertebral fracture  EKG: No EKG this admission     Assessment: 87-year-old female past medical history dementia admitted after ground-level fall with resultant C2 vertebral fracture and subdural hematoma, course completed by delirium        Plan:  Delirium  Hypokalemia  -Hypoactive, based on " family reports of acute changes  -Likely multifactorial the use of Ativan on admission did not likely help this patient's case  -No obvious current signs of infection we will continue to diligently watch for these, patient had fever on admission however this is resolved and can be seen with head injurys  -Recommend removal of Grande catheter  -Treatment of hypokalemia as you are  -Pain treatment as below  -Mobility to maximum level patient was independently mobile with a walker prior to admission and we should aim for this  -If patient is unable to swallow, and her delirium does not clear that portends a even more limited prognosis as family is not wishing to pursue tube feeding  -Watch for constipation, diarrhea as an outpatient, continue to [facility]     Dementia  -Unclear subtype, family reports multiple histories of TIAs however this MRI did not showed old strokes, no history of coronary artery disease  -Likely Alzheimer's dementia based on history  -On memantine 5 mg at home, continue to hold  -We will check TSH, B12 to ensure these are normal due to not having her system  -Appreciate family's assistance, the presence will likely improve the patient's stay  -Mobilize patient, patient is already delirious, will need to minimize further issues with this, consider recommend additions below     Hypertension  -Use of hydrochlorthiazide likely potential reason for hypokalemia, consider pain as a reason for hypertension rather than true essential hypertension.  -Once patient is able to take p.o. medications consider low-dose oxycodone 2.5 mg to address her pain and uptitrate as needed, watching mentation     Recurrent falls  Osteoporosis  -Patient's family reports history consistent with seizure however it seems that they get this worked up.  She is on prophylactic Keppra due to her subdural hematoma, per neurosurgery.  -Encourage mobility, she is on calcitonin as an outpatient unclear why she is on this rather than a  bisphosphonate.  -We will check a vitamin D level as patient is not on outpatient replacement.     Depression  -Recommend to resume escitalopram with patient is taking oral medication     Disposition  -Family would like to go to skilled nursing facility, PT OT SLP to see patient in house     C2 vertebral fx  Subdural hematoma  -Management per neurosurgery, trauma  -Schedule Tylenol  -Consider more aggressive pain treatment based on response to Tylenol.        Interventions to be considered in all patients in order to minimize the risk of delirium.   -do not disturb patient (vitals or lab draws) between the hours of 10 PM and 6 AM.  -ideally the patient should not sleep during the day and we should avoid day time naps.   -up in chair for meals  -ambulate at least three times daily, as able  -watch for constipation  -timed voiding - ask patient is she would like to go to the bathroom q 2-3 hours, except during the do not disturb hours.   -remove all necessary lines (central lines, peripheral IVs, feeding tubes, abrams catheters)  -unless patient has shown harm to self or others I would recommend against use of restraints - either chemical or physical (antipsychotics)   -minimize polypharmacy, do not dose medication during sleep hours        Thank you for this interesting consult. We will continue to follow this patient along with you.         Alan Patel M.D.  Geriatric Attending  [facility]  Available Monday-Friday 8:00-5:00 (excudling holidays)    This note was partially dictated with voice recognition software, for any confusion please do not hesitate to contact me.         Direct Links to Patient Handouts:     CDC Healthy Aging  NIH National [place] on Aging  [facility] Patient Resources     Links that can be Cut and Paste into your browser:     Healthy Aging  www.healthinaging.org  Staying Active  [email or web address]  Geriatrics Online    [email or web address]

## 2019-02-25 NOTE — CARE PLAN
Problem: Communication  Goal: The ability to communicate needs accurately and effectively will improve  Outcome: PROGRESSING SLOWER THAN EXPECTED      Problem: Psychosocial Needs:  Goal: Level of anxiety will decrease  Outcome: PROGRESSING SLOWER THAN EXPECTED      Problem: Skin Integrity  Goal: Risk for impaired skin integrity will decrease  Outcome: PROGRESSING AS EXPECTED

## 2019-02-25 NOTE — PROGRESS NOTES
0700 Bedside rounding complete. Patient a & o x self only. The patient is lethargic. Grande draining clear, yellow urine.  Bed alarm on, upper side rails up, bed locked and in lowest position. Pt incontinent of stool. Grande care done. Call bell and belongings within reach. Communication board updated and plan of care discussed with the patient. Patient educated on hourly rounding.     0850 Damon (JV) at bedside. Pt is confused at baseline. Pt only alert to self at home. Per Damon, pt has been very sleepy since yesterday. Repeat BMP pending.

## 2019-02-25 NOTE — PROGRESS NOTES
Trauma / Surgical Daily Progress Note    Date of Service  2/25/2019    Chief Complaint  87 y.o. female admitted 2/22/2019 with Subdural hematoma (HCC)    Interval Events  Remains hypokalemic  -change IV fluids  -continue to finish K supplement ordered this am  -repeat BMP after supplement finished  -replace phosphorus     Continue therapies    Normalize sleep wake cycles    Review of Systems  Review of Systems   Unable to perform ROS: Mental acuity        Vital Signs  Temp:  [36.7 °C (98.1 °F)-37.4 °C (99.3 °F)] 36.7 °C (98.1 °F)  Pulse:  [] 90  Resp:  [16-20] 16  BP: (135-150)/(67-93) 149/67  SpO2:  [95 %-99 %] 99 %    Physical Exam  Physical Exam   Constitutional: She appears well-developed and well-nourished. She is easily aroused. No distress. Cervical collar in place.   Elderly   HENT:   Head: Normocephalic.   Neck: Neck supple.   Cardiovascular: Normal rate.    Pulmonary/Chest: Effort normal. No respiratory distress.   Abdominal: Soft. Bowel sounds are normal.   Genitourinary:   Genitourinary Comments: Grande to gravity drain   Musculoskeletal:   Moves all extremities   Neurological: She is easily aroused. GCS eye subscore is 3. GCS verbal subscore is 3. GCS motor subscore is 5.   A & O to self only   Skin: Skin is warm and dry. There is pallor.   Nursing note and vitals reviewed.      Laboratory  Recent Results (from the past 24 hour(s))   Basic Metabolic Panel    Collection Time: 02/25/19  2:33 AM   Result Value Ref Range    Sodium 140 135 - 145 mmol/L    Potassium 2.5 (LL) 3.6 - 5.5 mmol/L    Chloride 103 96 - 112 mmol/L    Co2 25 20 - 33 mmol/L    Glucose 127 (H) 65 - 99 mg/dL    Bun 15 8 - 22 mg/dL    Creatinine 0.88 0.50 - 1.40 mg/dL    Calcium 9.4 8.5 - 10.5 mg/dL    Anion Gap 12.0 (H) 0.0 - 11.9   CBC WITH DIFFERENTIAL    Collection Time: 02/25/19  2:33 AM   Result Value Ref Range    WBC 10.7 4.8 - 10.8 K/uL    RBC 3.64 (L) 4.20 - 5.40 M/uL    Hemoglobin 11.5 (L) 12.0 - 16.0 g/dL    Hematocrit  33.4 (L) 37.0 - 47.0 %    MCV 91.8 81.4 - 97.8 fL    MCH 31.6 27.0 - 33.0 pg    MCHC 34.4 33.6 - 35.0 g/dL    RDW 46.2 35.9 - 50.0 fL    Platelet Count 137 (L) 164 - 446 K/uL    MPV 12.4 9.0 - 12.9 fL    Neutrophils-Polys 75.90 (H) 44.00 - 72.00 %    Lymphocytes 11.30 (L) 22.00 - 41.00 %    Monocytes 12.30 0.00 - 13.40 %    Eosinophils 0.00 0.00 - 6.90 %    Basophils 0.20 0.00 - 1.80 %    Immature Granulocytes 0.30 0.00 - 0.90 %    Nucleated RBC 0.00 /100 WBC    Neutrophils (Absolute) 8.10 (H) 2.00 - 7.15 K/uL    Lymphs (Absolute) 1.21 1.00 - 4.80 K/uL    Monos (Absolute) 1.31 (H) 0.00 - 0.85 K/uL    Eos (Absolute) 0.00 0.00 - 0.51 K/uL    Baso (Absolute) 0.02 0.00 - 0.12 K/uL    Immature Granulocytes (abs) 0.03 0.00 - 0.11 K/uL    NRBC (Absolute) 0.00 K/uL   Magnesium: Every Monday and Thursday AM    Collection Time: 02/25/19  2:33 AM   Result Value Ref Range    Magnesium 1.8 1.5 - 2.5 mg/dL   Phosphorus: Every Monday and Thursday AM    Collection Time: 02/25/19  2:33 AM   Result Value Ref Range    Phosphorus 2.2 (L) 2.5 - 4.5 mg/dL   ESTIMATED GFR    Collection Time: 02/25/19  2:33 AM   Result Value Ref Range    GFR If African American >60 >60 mL/min/1.73 m 2    GFR If Non African American >60 >60 mL/min/1.73 m 2   Basic Metabolic Panel    Collection Time: 02/25/19  8:06 AM   Result Value Ref Range    Sodium 140 135 - 145 mmol/L    Potassium 2.6 (LL) 3.6 - 5.5 mmol/L    Chloride 102 96 - 112 mmol/L    Co2 26 20 - 33 mmol/L    Glucose 128 (H) 65 - 99 mg/dL    Bun 14 8 - 22 mg/dL    Creatinine 0.88 0.50 - 1.40 mg/dL    Calcium 9.3 8.5 - 10.5 mg/dL    Anion Gap 12.0 (H) 0.0 - 11.9   ESTIMATED GFR    Collection Time: 02/25/19  8:06 AM   Result Value Ref Range    GFR If African American >60 >60 mL/min/1.73 m 2    GFR If Non African American >60 >60 mL/min/1.73 m 2       Fluids    Intake/Output Summary (Last 24 hours) at 02/25/19 0910  Last data filed at 02/25/19 0300   Gross per 24 hour   Intake              680 ml    Output             1650 ml   Net             -970 ml       Core Measures & Quality Metrics  Labs reviewed, Medications reviewed and Radiology images reviewed  Grande Catheter: Continue until more alert.      DVT Prophylaxis: Contraindicated - High bleeding risk  DVT prophylaxis - mechanical: SCDs  Ulcer prophylaxis: Yes    Assessed for rehab: Patient unable to tolerate rehabilitation therapeutic regimen    Total Score: 11    ETOH Screening     Reason for no ETOH Intervention: Traumatic Brain Injury (Mental acuity)  ETOH Screening     Intervention complete date: 2/23/2019        Assessment/Plan  Hypokalemia   Assessment & Plan    Replete and trend     Palliative care encounter- (present on admission)   Assessment & Plan    2/23 Palliative care consult for advance care planning and POLST completion.     Encounter for geriatric assessment- (present on admission)   Assessment & Plan    2/23 Geriatric consult placed.  Normalize sleep/wake cycles.     Traumatic brain injury (HCC)- (present on admission)   Assessment & Plan    Acute subdural vs epidural hemorrhage in the left parietal region with the greatest transverse diameter of 1.2cm causing mild local mass effect  Repeat interval CT imaging unchanged.  Non-operative management.   Post traumatic pharmacologic seizure prophylaxis for 1 week.  Andrea Alejo MD. Neurosurgery.     Fracture of second cervical vertebra (HCC)- (present on admission)   Assessment & Plan    Nondisplaced oblique fracture mid dens of C2.  Non-operative management.   Aspen collar on at all times for 6 weeks.  Andrea Alejo MD. Neurosurgery.     Family history of Alzheimer's disease- (present on admission)   Assessment & Plan    Premorbid.  Lives with daughter.  Ambulates with walker, has fallen a couple of times due to unsteady gait, able to complete all self care activities.  Normalize sleep/wake cycles.     Contraindication to deep vein thrombosis (DVT) prophylaxis- (present on admission)    Assessment & Plan    Systemic anticoagulation contraindicated secondary to elevated bleeding risk.  RAP score 11.  2/23 Surveillance venous duplex scanning ordered.  2/24 Duplex pending.     Platelet dysfunction due to drugs- (present on admission)   Assessment & Plan    History of ASA and Plavix for TIA.  Admission TEG with platelet mapping demonstrated prolonged AA.  2/22 Transfused 1 unit platelets.  Repeat TEG with 88.8% AA inhibition. Repeat imaging unchanged so no further platelet transfusion.  Hold ASA and Plavix for 6 weeks per neurosurgery.     Scalp laceration- (present on admission)   Assessment & Plan    Right scalp laceration.  Stapled repair completed in ED.  Plan for staple removal at 7 days (3/1).     Trauma- (present on admission)   Assessment & Plan    Ground level fall, unknown LOC.  Trauma Yellow Transfer Activation.  Smiley Collins MD. Trauma Surgery.     Essential hypertension- (present on admission)   Assessment & Plan    Chronic condition treated with Hyzaar.  2/23 Resumed maintenance medication.         Discussed patient condition with RN, Patient and trauma surgery.

## 2019-02-25 NOTE — DISCHARGE PLANNING
Care Transition Team Assessment    Information Source  Orientation : Disoriented to Time, Disoriented to Place, Disoriented to Event  Information Given By: Other (Comments) (children)  Informant's Name: Damon         Elopement Risk  Legal Hold: No  Ambulatory or Self Mobile in Wheelchair: No-Not an Elopement Risk  Elopement Risk: Not at Risk for Elopement    Interdisciplinary Discharge Planning  Does Admitting Nurse Feel This Could be a Complex Discharge?: No  Primary Care Physician: Dr. Magalys Perez  Lives with - Patient's Self Care Capacity: Adult Children (daughter Saundra)  Patient or legal guardian wants to designate a caregiver (see row info): No  Support Systems: Children, Family Member(s)  Housing / Facility: 2 Story House (21-23 steps)  Do You Take your Prescribed Medications Regularly: Yes  Able to Return to Previous ADL's: Future Time w/Therapy  Mobility Issues: Yes  Prior Services: None  Patient Expects to be Discharged to:: SNF  Assistance Needed: Yes  Durable Medical Equipment: Walker, Other - Specify (cane, shower chair)    Discharge Preparedness  What is your plan after discharge?: Skilled nursing facility  What are your discharge supports?: Other (comment) (family)  Prior Functional Level: Ambulatory, Needs Assist with Activities of Daily Living, Needs Assist with Medication Management, Uses Walker  Difficulity with ADLs: Bathing, Brushing teeth, Dressing, Eating, Toileting, Walking  Difficulity with IADLs: Cooking, Driving, Keeping track of finances, Laundry, Managing medication, Shopping, Using the telephone or computer    Functional Assesment  Prior Functional Level: Ambulatory, Needs Assist with Activities of Daily Living, Needs Assist with Medication Management, Uses Walker    Finances  Financial Barriers to Discharge: No  Prescription Coverage: Yes    Vision / Hearing Impairment  Vision Impairment : Yes  Right Eye Vision: Wears Glasses  Left Eye Vision: Wears Glasses  Hearing Impairment :  No    Values / Beliefs / Concerns  Values / Beliefs Concerns : No         Domestic Abuse  Have you ever been the victim of abuse or violence?: No  Physical Abuse or Sexual Abuse: No  Verbal Abuse or Emotional Abuse: No  Possible Abuse Reported to:: Not Applicable              Anticipated Discharge Information  Anticipated discharge disposition: SNF

## 2019-02-25 NOTE — CARE PLAN
Problem: Safety  Goal: Will remain free from falls    Intervention: Assess risk factors for falls  Patient free of falls. Family at bedside. Bed alarm on. Bed in lowest and locked position. Frequent rounding.       Problem: Venous Thromboembolism (VTW)/Deep Vein Thrombosis (DVT) Prevention:  Goal: Patient will participate in Venous Thrombosis (VTE)/Deep Vein Thrombosis (DVT)Prevention Measures    Intervention: Ensure patient wears graduated elastic stockings (KELLI hose) and/or SCDs, if ordered, when in bed or chair (Remove at least once per shift for skin check)  scds on.

## 2019-02-25 NOTE — PROGRESS NOTES
[facility].   Patient Name: Shirley Hearn  Patient Age, Gender: 87 y.o., female  Date of Consult:2/25/2019      Name of Requesting Consultant(s): Smiley Collins M.D.  Consultant: Alan Patel M.D.  Reason for Consult: Fall    Chief Complaint: Transfer from outside facility due to GLF.     History of Present Illness:  Shirley is a 87 y.o. female with a past medical history of moderate dementia with baseline dependence and IADLs and bathing, recurrent falls and osteoporosis.  History is provided by medical chart medical staff and family.  Patient is unable to provide a history.  Upon speaking with the family patient appears to have had of a mechanical fall that was witnessed by her daughter, which she lives with.  When the patient fell she hit her head was initially unresponsive.  The daughter is not completely sure of the events that fall, she called EMS and there was lots of different things going on.  She says the patient has episodes where she falls and remains doing fine and then all of a sudden completely falls down is out of it and confused for a while then goes back to normal.  She says she has had multiple MRIs of the brain as well as what sounds like an EEG without etiology to her issues. They say these falls occur about once a month and have been going on for over a year.    At baseline the family tells me that the patient does not like to be and is otherwise independent in her ADLs except for using a walker.  She is normally just oriented to self and not oriented to situation, place and time.  She is usually very talkative and the fact that she is not is abnormal for her.  Patient normally ambulates with a walker, and most of her falls are due to the fact that she is not using a walker, per family report, despite what they said earlier about her occasionally just losing consciousness.     The patient is followed by neurology at [facility].  They remarked that he previously trialed  donepezil which did cause some behavior issues.  They then transitioned of amantadine if not noticed any deleterious nor beneficial effects from his medication.  Patient is on medication for depression and is doing well on that per patient family report.    Patient enjoys shopping.  Palliative care is finishing up the visit just after entered the room in which she signed a family filled out a POLST dressing we should be able to allow the patient have a natural death, with limited interventions and no tube feeding.    The patient denied any pain this morning however her body posture grimacing suggest she was likely in pain.    Level of issues include persistent hypokalemia, and the patient was given 3 mg IV Ativan on admission.    Patient had issues diarrhea prior to admit    Activity Level:   Sedentary    Activities of Daily Living:   Total assist    This is not a reflection of patient's normal baseline which is more independent.    ROS: Unable to obtain meaningful review of systems due to patient's mental status    Past Medical History:   Diagnosis Date   • Hypertension    • Rheumatoid arthritis(714.0)        Current Facility-Administered Medications:   •  potassium phosphates 30 mmol in D5W 500 mL ivpb, 30 mmol, Intravenous, Once, Courtney Guthrie, A.P.R.N., Last Rate: 83.3 mL/hr at 02/25/19 1042, 30 mmol at 02/25/19 1042  •  0.9 % NaCl with KCl 40 mEq 1,000 mL, , Intravenous, Continuous, Leah Mosquera, A.P.N., Last Rate: 75 mL/hr at 02/25/19 1228  •  acetaminophen (TYLENOL) tablet 1,000 mg, 1,000 mg, Oral, TID, Leah Mosquera, A.P.N., Stopped at 02/25/19 1200  •  acetaminophen (TYLENOL) suppository 650 mg, 650 mg, Rectal, Q6HRS PRN, Leah Mosquera, A.P.N., 650 mg at 02/25/19 1212  •  potassium chloride SA (Kdur) tablet 20 mEq, 20 mEq, Oral, BID, Froilan Lopez M.D., 20 mEq at 02/24/19 0933  •  losartan (COZAAR) tablet 100 mg, 100 mg, Oral, Q DAY, 100 mg at 02/24/19 0526 **AND** hydroCHLOROthiazide  "(HYDRODIURIL) tablet 12.5 mg, 12.5 mg, Oral, Q DAY, Vanda Demarco, A.P.R.N., 12.5 mg at 02/24/19 0525  •  acetaminophen (TYLENOL) tablet 650 mg, 650 mg, Oral, Q4HRS PRN, Vanda Demarco, A.P.R.N.  •  levETIRAcetam (KEPPRA) 500 mg in  mL IVPB, 500 mg, Intravenous, Q12HRS, Andrea Alejo M.D., Stopped at 02/25/19 0520  •  Respiratory Care per Protocol, , Nebulization, Continuous RT, Smiley Collins M.D.  •  Pharmacy Consult Request ...Pain Management Review 1 Each, 1 Each, Other, PHARMACY TO DOSE, Smiley Collins M.D.  •  docusate sodium (COLACE) capsule 100 mg, 100 mg, Oral, BID, Smiley Collins M.D., Stopped at 02/22/19 2015  •  senna-docusate (PERICOLACE or SENOKOT S) 8.6-50 MG per tablet 1 Tab, 1 Tab, Oral, Nightly, Smiley Collins M.D., Stopped at 02/22/19 2100  •  senna-docusate (PERICOLACE or SENOKOT S) 8.6-50 MG per tablet 1 Tab, 1 Tab, Oral, Q24HRS PRN, Smiley Collins M.D., 1 Tab at 02/24/19 1529  •  polyethylene glycol/lytes (MIRALAX) PACKET 1 Packet, 1 Packet, Oral, BID, Smiley Collins M.D., Stopped at 02/24/19 1800  •  magnesium hydroxide (MILK OF MAGNESIA) suspension 30 mL, 30 mL, Oral, DAILY, Smiley Collins M.D., Stopped at 02/23/19 0600  •  bisacodyl (DULCOLAX) suppository 10 mg, 10 mg, Rectal, Q24HRS PRN, Smiley Collins M.D., 10 mg at 02/24/19 1834  •  fleet enema 133 mL, 1 Each, Rectal, Once PRN, Smiley Collins M.D.  •  famotidine (PEPCID) tablet 20 mg, 20 mg, Oral, DAILY **OR** famotidine (PEPCID) injection 20 mg, 20 mg, Intravenous, DAILY, Smiley Collins M.D., 20 mg at 02/25/19 0501  •  ondansetron (ZOFRAN) syringe/vial injection 4 mg, 4 mg, Intravenous, Q4HRS PRN, Smiley Collins M.D.  Social History     Social History   • Marital status:      Spouse name: N/A   • Number of children: N/A   • Years of education: N/A     Occupational History   • Not on file.     Social History Main Topics   • Smoking status: Former Smoker   • Smokeless tobacco: Not on file      Comment: quit \"years ago\" " "  • Alcohol use No      Comment: occasional   • Drug use: No   • Sexual activity: Not on file     Other Topics Concern   • Not on file     Social History Narrative   • No narrative on file     Family History   Problem Relation Age of Onset   • Other Mother    • Other Father      Past Surgical History:   Procedure Laterality Date   • HYSTERECTOMY RADICAL     • OTHER ORTHOPEDIC SURGERY      bilat knees   • TONSILLECTOMY           Physical Exam:  BP (!) 176/83   Pulse (!) 102   Temp 36.5 °C (97.7 °F) (Temporal)   Resp 15   Ht 1.6 m (5' 3\")   Wt 59.2 kg (130 lb 8.2 oz)   SpO2 98%   Breastfeeding? No   BMI 23.12 kg/m²   General: No acute distress, laying in bed, drowsy  Eyes: Bilateral blepharitis - treated with a warm, wet towel during the exam  Neck, hard c-collar in place  Neuro: Able to move all 4 extremities to command  Abdomen: Soft nontender nondistended   : Grande in place  Extremities: Peripheral IVs, vascular arthritic changes noted to hands bilaterally  Lungs: Clear to auscultation anteriorly, normal effort  Cardiovascular: Regular rate and rhythm 2+ radial pulses bilaterally 1+ DP pulses bilateral  Delirium Screen: Positive based on the mid observation that the patient is acutely different than baseline    Labs: CBC reviewed, anemia noted, CMP reviewed, noted hypokalemia normal magnesium, mild AK I on admission  Imaging: CT head showing left-sided subdural hematoma with small vessel disease, outside imaging shows C2 vertebral fracture  EKG: No EKG this admission    Assessment: 87-year-old female past medical history dementia admitted after ground-level fall with resultant C2 vertebral fracture and subdural hematoma, course completed by delirium      Plan:  Delirium  Hypokalemia  -Hypoactive, based on family reports of acute changes  -Likely multifactorial the use of Ativan on admission did not likely help this patient's case  -No obvious current signs of infection we will continue to diligently watch " for these, patient had fever on admission however this is resolved and can be seen with head injurys  -Recommend removal of Grande catheter  -Treatment of hypokalemia as you are  -Pain treatment as below  -Mobility to maximum level patient was independently mobile with a walker prior to admission and we should aim for this  -If patient is unable to swallow, and her delirium does not clear that portends a even more limited prognosis as family is not wishing to pursue tube feeding  -Watch for constipation, diarrhea as an outpatient, continue to [facility]    Dementia  -Unclear subtype, family reports multiple histories of TIAs however this MRI did not showed old strokes, no history of coronary artery disease  -Likely Alzheimer's dementia based on history  -On memantine 5 mg at home, continue to hold  -We will check TSH, B12 to ensure these are normal due to not having her system  -Appreciate family's assistance, the presence will likely improve the patient's stay  -Mobilize patient, patient is already delirious, will need to minimize further issues with this, consider recommend additions below    Hypertension  -Use of hydrochlorthiazide likely potential reason for hypokalemia, consider pain as a reason for hypertension rather than true essential hypertension.  -Once patient is able to take p.o. medications consider low-dose oxycodone 2.5 mg to address her pain and uptitrate as needed, watching mentation    Recurrent falls  Osteoporosis  -Patient's family reports history consistent with seizure however it seems that they get this worked up.  She is on prophylactic Keppra due to her subdural hematoma, per neurosurgery.  -Encourage mobility, she is on calcitonin as an outpatient unclear why she is on this rather than a bisphosphonate.  -We will check a vitamin D level as patient is not on outpatient replacement.    Depression  -Recommend to resume escitalopram with patient is taking oral medication    Disposition  -Family  would like to go to skilled nursing facility, PT OT SLP to see patient in house    C2 vertebral fx  Subdural hematoma  -Management per neurosurgery, trauma  -Schedule Tylenol  -Consider more aggressive pain treatment based on response to Tylenol.      Interventions to be considered in all patients in order to minimize the risk of delirium.   -do not disturb patient (vitals or lab draws) between the hours of 10 PM and 6 AM.  -ideally the patient should not sleep during the day and we should avoid day time naps.   -up in chair for meals  -ambulate at least three times daily, as able  -watch for constipation  -timed voiding - ask patient is she would like to go to the bathroom q 2-3 hours, except during the do not disturb hours.   -remove all necessary lines (central lines, peripheral IVs, feeding tubes, abrams catheters)  -unless patient has shown harm to self or others I would recommend against use of restraints - either chemical or physical (antipsychotics)   -minimize polypharmacy, do not dose medication during sleep hours      Thank you for this interesting consult. We will continue to follow this patient along with you.       Alan Patel M.D.  Geriatric Attending  [facility]  Available Monday-Friday 8:00-5:00 (excudling holidays)    This note was partially dictated with voice recognition software, for any confusion please do not hesitate to contact me.       Direct Links to Patient Handouts:    CDC Healthy Aging  NIH National [place] on Aging  [facility] Patient Resources    Links that can be Cut and Paste into your browser:    Healthy Aging  www.healthinaging.org  Staying Active  www.activeandhealthy.[place].gov.au  Geriatrics Online   [email or web address]

## 2019-02-25 NOTE — THERAPY
"Physical Therapy Evaluation completed.   Bed Mobility:  Supine to Sit: Maximal Assist  Transfers: Sit to Stand: Moderate Assist (x 3 reps)  Gait: Level Of Assist: Unable to Participate  Plan of Care: Will benefit from Physical Therapy 4 times per week  Discharge Recommendations: Equipment: Will Continue to Assess for Equipment Needs.   Pt is an 88 yo female with subdural hematoma and non-surgical C2 fracture after a fall at home. Today, pt showed expressive aphasia and possibly receptive as she struggled to follow commands and did not appear to be limited by hearing loss. Pt was able to stand x 3 reps at EOB with Mod A of 2, h/o B knee pain. Pt was unable to take any steps. Pt will need inpt PT to address problems and assist with d/c plan. Pt will need a transitional care stay before home.     See \"Rehab Therapy-Acute\" Patient Summary Report for complete documentation.     "

## 2019-02-25 NOTE — PALLIATIVE CARE
Palliative Care follow-up  Pt currently working with therapies. Family stepped out and will return shortly. Spoke with BS RN and provided contact number. Will return and speak with family when they return.     Thank you for allowing Palliative Care to participate in this patient's care. Please feel free to call [phone] with any questions or concerns.

## 2019-02-25 NOTE — PROGRESS NOTES
Report given to Nurse on neurosurgery. Pt admit profile incomplete, pt is not appropriate enough to answer questions about depression and past medical history. Pt belonings, walker, dentures, and stuffed animal sent with patient and transport at 1755.

## 2019-02-25 NOTE — PROGRESS NOTES
RN MOBILITY NOTE     Surgery patient?: no  Date of surgery: n/a  Ambulated 50 ft on day of surgery? (N/A if today is not date of surgery): n/a  Number of times ambulated 50 feet or greater today: 0  Patient has been up to chair, edge of bed or HOB 90 degrees for all meals?: yes  Goal met? (goal is ambulating at least 50 feet 2 times on day shift, one time on night shift): no  If patient did not meet mobility goal, why?: attempted to ambulate with the patient but she was too weak.

## 2019-02-25 NOTE — PROGRESS NOTES
0905 Khoi [phone] from Lab called with critical result of potassium at 2.6. Critical lab result read back to Khoi.   Leah Mosquera NP notified of critical lab result at 0910.  Critical lab result read back by Leah Mosquera NP. New orders placed.     1320 SLP at bedside for swallow eval. S/w Leah MARTINI from trauma. Advance diet at tolerated. Leah MARTINI notified of patient's /83 and . Pt didn't receive oral Cozaar 100mg and Hydrochlorothiazide 12.5mg d/t decreased mental status. No IV PRN antihypertensives ordered. Leah MARTINI states to give morning meds after SLP eval. If pt fails SLP call trauma for PRN BP meds.

## 2019-02-25 NOTE — CONSULTS
"Reason for PC Consult: Advance Care Planning    Consulted by: Vanda MARTINI    Assessment:  General: 87 yr old female admitted on 2/22/2019 for Subdural hematoma. Pt has hx of HTN, Rheumatoid arthritis. Notes from 2015 indicate CKD III as another possible diagnosis.   Pt had GLF at home and had increased confusion. Brought to ED at another facility,  CT showed C2 fx and intracranial bleed. Pt transferred to [facility].     Dyspnea: No, resp rate 15, 98% 2 LNC  Last BM: 02/25/19    Pain: Unable to determine    Depression: Unable to determine   Dementia: Unable to Determine       Spiritual:  Is Gnosticism or spirituality important for coping with this illness? No    Has a  or spiritual provider visit been requested? No    Palliative Performance Scale: 40%    Advance Directive: None on file.    DPOA: No    POLST: No    Code Status: DNR/DNI     Outcome:  Met with pts family and pt at the bedside. pts daughter Saundra (who she lives with), Daughter Damon and her spouse Obdulio, and pts daughter Karol her from [place]. Family all concerned for their mothers comfort. Discussed pts current condition, age and quality of life. Family agrees that their mother would not wish for TFs and does not want to be \"kept alive\" with extraordinary  measures. Spoke about SNF vs Hospice support. Pts spouse passed away after \"18 days in ICU\" per daughters, \"and we know she does not want anything like that\". Spoke about end of life, discussed extra assistance. Discussed what hospice can provide, as in needed ZEKE, MD oversight, RN and CNA visits and 24/7 accessibility for any problems. Discussed that the burden of care continues to be on the family.   Discussed pts current needs. Spoke about diet for quality of life, discussed liquid medications while on hospice. Discussed compliance with therapies and progress for SNF stay.   Family would like to have their mother DC to SNF at this time. They hope that she may progress some and stabilize. " "If she is unable to, they feel that at that time they would opt for Hospice. Karol stated that she flys back to [place] in approx 2 days and then she can \"take care of some things' and return to help with their mothers care if needed.   Family agrees that the pt would not want TFs.   Went over POLST. Dicussed DNAR/DNI status, discussed pts wish for no TFs. Discussed current plan for continued treatment and if no progress then a move towards hospice. POLST left on hard chart for review and APRN signature.   Provided family with contact number for any needs.     Updated: APRN, BS RN, Unit CM RN    Plan: Possible DC to SNF, if pt stabilizes.     Recommendations: I do not recommend an ethics or hospice consult at this time because family wishes to continue with current treatment plan. If pt was to decline, hospice will be appropriate.    Thank you for allowing Palliative Care to participate in this patient's care. Please feel free to call [phone] with any questions or concerns.  "

## 2019-02-25 NOTE — THERAPY
"Occupational Therapy Evaluation completed.   Functional Status:  Max A for supine>sit; mod Ax2 for sit>stand; total A for UB/LB dressing; max A for grooming while seated; max A for sit>supine  Plan of Care: Will benefit from Occupational Therapy 2 times per week.  Discharge Recommendations:  Equipment: Will Continue to Assess for Equipment Needs. Recommend inpatient transitional care services for continued occupational therapy services.     See \"Rehab Therapy-Acute\" Patient Summary Report for complete documentation.    OT eval completed on 86 YO F admitted after GLF with subdural hematoma and C2 fracture. Pt with limited verbal responses and inconsistent with following commands. Pt initially guarding RUE and difficult to assess PROM. Pt required hand-over-hand to complete grooming while seated. Pt's family present and able to provide PLOF however will need to verify BADLs. Pt will likely require post-acute inpatient transitional stay prior to d/c home. Will continune to follow for acute OT services while in-house.   "

## 2019-02-25 NOTE — DISCHARGE PLANNING
Agency/Facility Name: [facility]   Spoke To: Jessica   Outcome: Patient declined. Facility is not contracted with patient's insurance.     Agency/Facility Name: [facility]    Spoke To: Lucero   Outcome: Patient declined. Facility is not contract with patient's insurance.

## 2019-02-25 NOTE — DISCHARGE PLANNING
Received Choice form at 1140.  Agency/Facility Name: [facility], [facility], and [facility].   Referral sent per Choice form at 1145.

## 2019-02-25 NOTE — THERAPY
"Speech Language Therapy Clinical Swallow Evaluation completed.  Functional Status: Patient currently on clear liquid diet, but per RN, patient has not been awake enough today to safely consume PO, and per family, PO intake has been limited since admit d/t same. Per RN, trauma APN reported it was okay to ADAT. Patient awake, alert, with several family members present at bedside. Patient pleasant and cooperative during evaluation. Patient unable to follow simple directives for oral motor evaluation, but upon inspection, no gross deficits noted with the exception of edentulous state. Patient consumed PO trials of single ice chips, NTL via tsp, cup sip, and straw, purees, pudding, and thin liquids via tsp and cup sip. Patient consumed PO trials of ice chips, NTL via tsp, cup sip, and straw, purees, and pudding with no overt s/sx of aspiration. PO trials of thins via tsp and cup sip resulted in slight wet/gurgly vocal quality and delayed initiation of swallow trigger, putting patient at risk for penetration/aspiration. Laryngeal elevation palpated as weak. Patient became fatigued as PO trials progressed and required intermittent verbal and tactile cueing to sustain alertness. At this time, recommend patient start NTFL diet with strict 1:1 feeding. Crush meds in puree. Please encourage PO intake when patient is awake and alert. RN and family aware. SLP to follow. Thank you for the consult.     Recommendations - Diet: Nectar Thick Full Liquid                          Strategies: Strict 1:1 feeding  and Head of Bed at 90 Degrees                          Medication Administration: Crush all Medications in Puree  Plan of Care: Will benefit from Speech Therapy 3 times per week  Post-Acute Therapy: Recommend inpatient transitional care services for continued speech therapy services.      See \"Rehab Therapy-Acute\" Patient Summary Report for complete documentation.   "

## 2019-02-25 NOTE — PROGRESS NOTES
2 RN skin check complete. Pt has diffuse bruising on all extremities. Mepilex to sacrum for protection, blanching. Staple to head for lac from fall @ home.

## 2019-02-25 NOTE — PROGRESS NOTES
Pt's K+ is 2.5.  Called Courtney, trauma APRN, to request a transfer off the floor to telemetry.  Courtney MARTINI explained we don't transfer trauma pts to tele, and asked us to put pt on tele on this floor.  Informed Courtney MARTINI that pt has no potassium replacement protocol, Courtney stated she would put in orders for care.

## 2019-02-26 PROBLEM — Z02.9 DISCHARGE PLANNING ISSUES: Status: ACTIVE | Noted: 2019-02-26

## 2019-02-26 LAB
25(OH)D3 SERPL-MCNC: 34 NG/ML (ref 30–100)
ANION GAP SERPL CALC-SCNC: 8 MMOL/L (ref 0–11.9)
BASOPHILS # BLD AUTO: 0.2 % (ref 0–1.8)
BASOPHILS # BLD: 0.02 K/UL (ref 0–0.12)
BUN SERPL-MCNC: 12 MG/DL (ref 8–22)
CALCIUM SERPL-MCNC: 9.7 MG/DL (ref 8.5–10.5)
CHLORIDE SERPL-SCNC: 103 MMOL/L (ref 96–112)
CO2 SERPL-SCNC: 27 MMOL/L (ref 20–33)
CREAT SERPL-MCNC: 0.97 MG/DL (ref 0.5–1.4)
EOSINOPHIL # BLD AUTO: 0.01 K/UL (ref 0–0.51)
EOSINOPHIL NFR BLD: 0.1 % (ref 0–6.9)
ERYTHROCYTE [DISTWIDTH] IN BLOOD BY AUTOMATED COUNT: 46.8 FL (ref 35.9–50)
GLUCOSE SERPL-MCNC: 138 MG/DL (ref 65–99)
HCT VFR BLD AUTO: 35.6 % (ref 37–47)
HGB BLD-MCNC: 12.1 G/DL (ref 12–16)
IMM GRANULOCYTES # BLD AUTO: 0.04 K/UL (ref 0–0.11)
IMM GRANULOCYTES NFR BLD AUTO: 0.4 % (ref 0–0.9)
LYMPHOCYTES # BLD AUTO: 1.11 K/UL (ref 1–4.8)
LYMPHOCYTES NFR BLD: 11.4 % (ref 22–41)
MCH RBC QN AUTO: 31.3 PG (ref 27–33)
MCHC RBC AUTO-ENTMCNC: 34 G/DL (ref 33.6–35)
MCV RBC AUTO: 92.2 FL (ref 81.4–97.8)
MONOCYTES # BLD AUTO: 1.53 K/UL (ref 0–0.85)
MONOCYTES NFR BLD AUTO: 15.8 % (ref 0–13.4)
NEUTROPHILS # BLD AUTO: 6.99 K/UL (ref 2–7.15)
NEUTROPHILS NFR BLD: 72.1 % (ref 44–72)
NRBC # BLD AUTO: 0 K/UL
NRBC BLD-RTO: 0 /100 WBC
PLATELET # BLD AUTO: 136 K/UL (ref 164–446)
PMV BLD AUTO: 12.3 FL (ref 9–12.9)
POTASSIUM SERPL-SCNC: 3.6 MMOL/L (ref 3.6–5.5)
RBC # BLD AUTO: 3.86 M/UL (ref 4.2–5.4)
SODIUM SERPL-SCNC: 138 MMOL/L (ref 135–145)
TSH SERPL DL<=0.005 MIU/L-ACNC: 3.89 UIU/ML (ref 0.38–5.33)
VIT B12 SERPL-MCNC: >1500 PG/ML (ref 211–911)
WBC # BLD AUTO: 9.7 K/UL (ref 4.8–10.8)

## 2019-02-26 PROCEDURE — A9270 NON-COVERED ITEM OR SERVICE: HCPCS | Performed by: INTERNAL MEDICINE

## 2019-02-26 PROCEDURE — 97530 THERAPEUTIC ACTIVITIES: CPT

## 2019-02-26 PROCEDURE — 85025 COMPLETE CBC W/AUTO DIFF WBC: CPT

## 2019-02-26 PROCEDURE — 700102 HCHG RX REV CODE 250 W/ 637 OVERRIDE(OP): Performed by: NURSE PRACTITIONER

## 2019-02-26 PROCEDURE — 306396 CUSHION, WAFFLE ADULT 17X17: Performed by: SURGERY

## 2019-02-26 PROCEDURE — A9270 NON-COVERED ITEM OR SERVICE: HCPCS | Performed by: NURSE PRACTITIONER

## 2019-02-26 PROCEDURE — 82306 VITAMIN D 25 HYDROXY: CPT

## 2019-02-26 PROCEDURE — 770001 HCHG ROOM/CARE - MED/SURG/GYN PRIV*

## 2019-02-26 PROCEDURE — 700101 HCHG RX REV CODE 250: Performed by: NURSE PRACTITIONER

## 2019-02-26 PROCEDURE — 97112 NEUROMUSCULAR REEDUCATION: CPT

## 2019-02-26 PROCEDURE — A9270 NON-COVERED ITEM OR SERVICE: HCPCS | Performed by: SURGERY

## 2019-02-26 PROCEDURE — 700111 HCHG RX REV CODE 636 W/ 250 OVERRIDE (IP): Performed by: NEUROLOGICAL SURGERY

## 2019-02-26 PROCEDURE — 700105 HCHG RX REV CODE 258: Performed by: NEUROLOGICAL SURGERY

## 2019-02-26 PROCEDURE — 99232 SBSQ HOSP IP/OBS MODERATE 35: CPT | Performed by: INTERNAL MEDICINE

## 2019-02-26 PROCEDURE — 700102 HCHG RX REV CODE 250 W/ 637 OVERRIDE(OP): Performed by: SURGERY

## 2019-02-26 PROCEDURE — 82607 VITAMIN B-12: CPT

## 2019-02-26 PROCEDURE — 80048 BASIC METABOLIC PNL TOTAL CA: CPT

## 2019-02-26 PROCEDURE — 84443 ASSAY THYROID STIM HORMONE: CPT

## 2019-02-26 PROCEDURE — 36415 COLL VENOUS BLD VENIPUNCTURE: CPT

## 2019-02-26 PROCEDURE — 700102 HCHG RX REV CODE 250 W/ 637 OVERRIDE(OP): Performed by: INTERNAL MEDICINE

## 2019-02-26 PROCEDURE — 51798 US URINE CAPACITY MEASURE: CPT

## 2019-02-26 RX ORDER — ESCITALOPRAM OXALATE 10 MG/1
10 TABLET ORAL DAILY
Status: DISCONTINUED | OUTPATIENT
Start: 2019-02-26 | End: 2019-03-04 | Stop reason: HOSPADM

## 2019-02-26 RX ORDER — DEXTROSE MONOHYDRATE, SODIUM CHLORIDE, AND POTASSIUM CHLORIDE 50; 1.49; 4.5 G/1000ML; G/1000ML; G/1000ML
INJECTION, SOLUTION INTRAVENOUS CONTINUOUS
Status: DISCONTINUED | OUTPATIENT
Start: 2019-02-26 | End: 2019-03-04 | Stop reason: HOSPADM

## 2019-02-26 RX ADMIN — POTASSIUM CHLORIDE, DEXTROSE MONOHYDRATE AND SODIUM CHLORIDE: 150; 5; 450 INJECTION, SOLUTION INTRAVENOUS at 18:24

## 2019-02-26 RX ADMIN — FAMOTIDINE 20 MG: 20 TABLET ORAL at 04:29

## 2019-02-26 RX ADMIN — SODIUM CHLORIDE 500 MG: 9 INJECTION, SOLUTION INTRAVENOUS at 17:04

## 2019-02-26 RX ADMIN — SODIUM CHLORIDE 500 MG: 9 INJECTION, SOLUTION INTRAVENOUS at 07:27

## 2019-02-26 RX ADMIN — LOSARTAN POTASSIUM 100 MG: 50 TABLET ORAL at 04:29

## 2019-02-26 RX ADMIN — ACETAMINOPHEN 1000 MG: 500 TABLET ORAL at 04:29

## 2019-02-26 RX ADMIN — ESCITALOPRAM OXALATE 10 MG: 10 TABLET ORAL at 16:47

## 2019-02-26 RX ADMIN — ACETAMINOPHEN 1000 MG: 500 TABLET ORAL at 12:47

## 2019-02-26 RX ADMIN — ACETAMINOPHEN 1000 MG: 500 TABLET ORAL at 16:47

## 2019-02-26 RX ADMIN — HYDROCHLOROTHIAZIDE 12.5 MG: 25 TABLET ORAL at 04:29

## 2019-02-26 ASSESSMENT — COGNITIVE AND FUNCTIONAL STATUS - GENERAL
MOVING FROM LYING ON BACK TO SITTING ON SIDE OF FLAT BED: UNABLE
SUGGESTED CMS G CODE MODIFIER MOBILITY: CM
TURNING FROM BACK TO SIDE WHILE IN FLAT BAD: UNABLE
MOBILITY SCORE: 7
STANDING UP FROM CHAIR USING ARMS: A LOT
CLIMB 3 TO 5 STEPS WITH RAILING: TOTAL
WALKING IN HOSPITAL ROOM: TOTAL
MOVING TO AND FROM BED TO CHAIR: UNABLE

## 2019-02-26 ASSESSMENT — GAIT ASSESSMENTS: GAIT LEVEL OF ASSIST: UNABLE TO PARTICIPATE

## 2019-02-26 NOTE — PROGRESS NOTES
0730 Bedside rounding complete. Patient a & o x self only. Pt restless in bed, removing gown frequently.  Pt incontinent of stool. Bed alarm on, upper side rails up, bed locked and in lowest position. Call bell and belongings within reach. Communication board updated and plan of care discussed with the patient. Patient educated on hourly rounding.     0900 IV infiltrated in left arm. Left forearm swollen with no redness. Pt complains of some tenderness. IV dc'd.      1100 Pt up to chair with family at bedside. Pt very weak. 2 person assist. Unable to ambulate but a couple steps.     1315 Grande dc'd without difficulty.

## 2019-02-26 NOTE — PROGRESS NOTES
"Geriatric Progress Note:  2/26/2019  CC: GLF at home    S: Patient is improved mentation today however is continues to be confused.  Patient was able to sit up in the chair for an hour however and went back to bed after as she was getting dizzy per nurse report.  Patient had Grande removed this morning, patient had some diarrhea overnight, slowed down this morning.  Patient herself this morning was very drowsy and arousable to loud voice however quickly fell back asleep.  Family remarked patient is closer back to normal however she continues to still be weak and her speaking is not back to normal, speech saw patient provided her diet.    ROS: Unable to obtain meaningful review of systems due to patient's altered mental status    O:/68   Pulse 77   Temp 36.6 °C (97.9 °F) (Temporal)   Resp 18   Ht 1.6 m (5' 3\")   Wt 59.2 kg (130 lb 8.2 oz)   SpO2 94%   Breastfeeding? No   BMI 23.12 kg/m²   General: No acute distress, laying in bed, drowsy  Eyes: No blepharitis noted  Neck, hard c-collar in place  Neuro: Able to move all 4 extremities to command  Abdomen: Soft nontender nondistended   Lungs: Clear to auscultation anteriorly, normal effort  Cardiovascular: Regular rate and rhythm 2+ radial pulses bilaterally 1+ DP pulses bilateral  Delirium Screen: Positive based on the mid observation that the patient is acutely different than baseline    Labs/Imaging/EKG: CBC, BMP, B12, vitamin D, TSH reviewed    Assessment: 87-year-old female past medical history dementia admitted after ground-level fall with resultant C2 vertebral fracture and subdural hematoma, course completed by delirium        Plan:  Delirium  Hypokalemia  -Hypoactive, based on family reports of acute changes  -Likely multifactorial the use of Ativan on admission did not likely help this patient's case  -No obvious current signs of infection we will continue to diligently watch for these, patient had fever on admission however this is resolved and " can be seen with head injurys  -Treatment of hypokalemia as you are  -Pain treatment as below  -Mobility to maximum level patient was independently mobile with a walker prior to admission and we should aim for this  -If patient is unable to swallow, and her delirium does not clear that portends a even more limited prognosis as family is not wishing to pursue tube feeding  -Watch for constipation, diarrhea as an outpatient, continue to [facility]     Dementia  -Unclear subtype, family reports multiple histories of TIAs however this MRI did not showed old strokes, no history of coronary artery disease  -Likely Alzheimer's dementia based on history  -On memantine 5 mg at home, continue to hold  -TSH and B12 are normal  -Appreciate family's assistance, the presence will likely improve the patient's stay  -Mobilize patient, patient is already delirious, will need to minimize further issues with this, consider recommend additions below     Hypertension  -Use of hydrochlorthiazide likely potential reason for hypokalemia, consider pain as a reason for hypertension rather than true essential hypertension.  -Once patient is able to take p.o. medications consider low-dose oxycodone 2.5 mg to address her pain and uptitrate as needed, watching mentation     Recurrent falls  Osteoporosis  -Patient's family reports history consistent with seizure however it seems that they get this worked up.  She is on prophylactic Keppra due to her subdural hematoma, per neurosurgery.  -Encourage mobility, she is on calcitonin as an outpatient unclear why she is on this rather than a bisphosphonate.  -Vitamin D level normal     Depression  - resume escitalopram as patient is able to take oral medication     Disposition  -Family would like to go to skilled nursing facility, PT OT SLP to see patient in house     C2 vertebral fx  Subdural hematoma  -Management per neurosurgery, trauma  -Schedule Tylenol  -Consider more aggressive pain treatment based  on response to Tylenol.  Currently appears to be stable        Interventions to be considered in all patients in order to minimize the risk of delirium.   -do not disturb patient (vitals or lab draws) between the hours of 10 PM and 6 AM.  -ideally the patient should not sleep during the day and we should avoid day time naps.   -up in chair for meals  -ambulate at least three times daily, as able  -watch for constipation  -timed voiding - ask patient is she would like to go to the bathroom q 2-3 hours, except during the do not disturb hours.   -remove all necessary lines (central lines, peripheral IVs, feeding tubes, abrams catheters)  -unless patient has shown harm to self or others I would recommend against use of restraints - either chemical or physical (antipsychotics)   -minimize polypharmacy, do not dose medication during sleep hours      Thank you for including us in the care of this patient. We will follow along tomorrow.     Alan Patel M.D.  Geriatric Physician   Can be reached at extension: [phone]  Monday - Friday 8-5      This note was partially complete completed using voice recognition software.  I did my best to correct errors prior to submitting this note, but for any concerns please do not hesitate to contact me.

## 2019-02-26 NOTE — CARE PLAN
Problem: Safety  Goal: Will remain free from falls  Outcome: PROGRESSING AS EXPECTED  Educated pt to call before getting up bed alarm on and call light with in reach.    Problem: Venous Thromboembolism (VTW)/Deep Vein Thrombosis (DVT) Prevention:  Goal: Patient will participate in Venous Thrombosis (VTE)/Deep Vein Thrombosis (DVT)Prevention Measures  Outcome: PROGRESSING AS EXPECTED

## 2019-02-26 NOTE — DISCHARGE PLANNING
Agency/Facility Name: [facility]   Spoke To: Fax notifcaiton  Outcome: Patient declined, care exceeds capacity. [facility] left a message for Tory regarding more information decline.     Agency/Facility Name: [facility]   Spoke To: Brannon   Outcome: Referral currently under review.

## 2019-02-26 NOTE — PALLIATIVE CARE
"Palliative Care follow-up  Was stopped in the felipe by family. Daughters Saundra and Karol at the bedside. Per family they were contacted by a male employee [employer] last night who asked them \"we need to get your mother out, where are you going to take her\". Family rightly anxious and upset. Calmed family and let them know that no ne was expecting them to take their mother in their personal car anywhere and that the Unit SW/CM RN were working on SNf placement at this time. Let family know that I would update the SW/CM RN and to call for any needs or further confusion.       Updated: Knocked on SW/CM RN office, door locked, no answer. Called, no answer, left message that family at bedside and is requiring an update on DC plan due to overnight confusion.     Plan: Continue with current plan for DC to SNF and transition to Hospice if pt declines. POLST scanned into EMR, original on pts hard chart for DC    Thank you for allowing Palliative Care to participate in this patient's care. Please feel free to call [phone] with any questions or concerns.  "

## 2019-02-26 NOTE — PROGRESS NOTES
Trauma / Surgical Daily Progress Note    Date of Service  2/26/2019    Chief Complaint  87 y.o. female admitted 2/22/2019 with Subdural hematoma (HCC)    Interval Events  Trial removal of abrams    Hypokalemia  -resolving  -continue IV fluids through today    Approaching medical clearance for post acute services if hypokalemia resolved   -acceptance pending    Family at bedside  -counseled    Review of Systems  Review of Systems   Unable to perform ROS: Mental acuity   Gastrointestinal:        Last bowel movement 2/26        Vital Signs  Temp:  [36.6 °C (97.9 °F)-37.2 °C (99 °F)] 36.6 °C (97.9 °F)  Pulse:  [77-98] 77  Resp:  [14-20] 18  BP: (115-170)/() 132/68  SpO2:  [94 %-100 %] 94 %    Physical Exam  Physical Exam   Constitutional: She appears well-developed and well-nourished. She is easily aroused. No distress. Cervical collar in place.   Elderly   HENT:   Head: Normocephalic.   Eyes: Conjunctivae are normal.   Neck: Neck supple.   Cardiovascular: Normal rate.    Pulmonary/Chest: Effort normal. No respiratory distress.   Abdominal: Soft. Bowel sounds are normal. She exhibits no distension.   Genitourinary:   Genitourinary Comments: Abrams to gravity drain-remove today   Musculoskeletal: She exhibits no edema or tenderness.   Moves all extremities   Neurological: She is easily aroused. GCS eye subscore is 3. GCS verbal subscore is 3. GCS motor subscore is 5.   A & O to self only   Skin: Skin is warm and dry. No pallor.   Nursing note and vitals reviewed.      Laboratory  Recent Results (from the past 24 hour(s))   Basic Metabolic Panel    Collection Time: 02/25/19  1:21 PM   Result Value Ref Range    Sodium 139 135 - 145 mmol/L    Potassium 3.3 (L) 3.6 - 5.5 mmol/L    Chloride 102 96 - 112 mmol/L    Co2 26 20 - 33 mmol/L    Glucose 140 (H) 65 - 99 mg/dL    Bun 15 8 - 22 mg/dL    Creatinine 0.96 0.50 - 1.40 mg/dL    Calcium 9.9 8.5 - 10.5 mg/dL    Anion Gap 11.0 0.0 - 11.9   ESTIMATED GFR    Collection Time:  02/25/19  1:21 PM   Result Value Ref Range    GFR If African American >60 >60 mL/min/1.73 m 2    GFR If Non African American 55 (A) >60 mL/min/1.73 m 2   Basic Metabolic Panel    Collection Time: 02/26/19  2:23 AM   Result Value Ref Range    Sodium 138 135 - 145 mmol/L    Potassium 3.6 3.6 - 5.5 mmol/L    Chloride 103 96 - 112 mmol/L    Co2 27 20 - 33 mmol/L    Glucose 138 (H) 65 - 99 mg/dL    Bun 12 8 - 22 mg/dL    Creatinine 0.97 0.50 - 1.40 mg/dL    Calcium 9.7 8.5 - 10.5 mg/dL    Anion Gap 8.0 0.0 - 11.9   CBC WITH DIFFERENTIAL    Collection Time: 02/26/19  2:23 AM   Result Value Ref Range    WBC 9.7 4.8 - 10.8 K/uL    RBC 3.86 (L) 4.20 - 5.40 M/uL    Hemoglobin 12.1 12.0 - 16.0 g/dL    Hematocrit 35.6 (L) 37.0 - 47.0 %    MCV 92.2 81.4 - 97.8 fL    MCH 31.3 27.0 - 33.0 pg    MCHC 34.0 33.6 - 35.0 g/dL    RDW 46.8 35.9 - 50.0 fL    Platelet Count 136 (L) 164 - 446 K/uL    MPV 12.3 9.0 - 12.9 fL    Neutrophils-Polys 72.10 (H) 44.00 - 72.00 %    Lymphocytes 11.40 (L) 22.00 - 41.00 %    Monocytes 15.80 (H) 0.00 - 13.40 %    Eosinophils 0.10 0.00 - 6.90 %    Basophils 0.20 0.00 - 1.80 %    Immature Granulocytes 0.40 0.00 - 0.90 %    Nucleated RBC 0.00 /100 WBC    Neutrophils (Absolute) 6.99 2.00 - 7.15 K/uL    Lymphs (Absolute) 1.11 1.00 - 4.80 K/uL    Monos (Absolute) 1.53 (H) 0.00 - 0.85 K/uL    Eos (Absolute) 0.01 0.00 - 0.51 K/uL    Baso (Absolute) 0.02 0.00 - 0.12 K/uL    Immature Granulocytes (abs) 0.04 0.00 - 0.11 K/uL    NRBC (Absolute) 0.00 K/uL   TSH WITH REFLEX TO FT4    Collection Time: 02/26/19  2:23 AM   Result Value Ref Range    TSH 3.890 0.380 - 5.330 uIU/mL   VITAMIN B12    Collection Time: 02/26/19  2:23 AM   Result Value Ref Range    Vitamin B12 -True Cobalamin >1500 (H) 211 - 911 pg/mL   VITAMIN D,25 HYDROXY    Collection Time: 02/26/19  2:23 AM   Result Value Ref Range    25-Hydroxy   Vitamin D 25 34 30 - 100 ng/mL   ESTIMATED GFR    Collection Time: 02/26/19  2:23 AM   Result Value Ref Range     GFR If African American >60 >60 mL/min/1.73 m 2    GFR If Non African American 54 (A) >60 mL/min/1.73 m 2       Fluids    Intake/Output Summary (Last 24 hours) at 02/26/19 1214  Last data filed at 02/25/19 2100   Gross per 24 hour   Intake              125 ml   Output              600 ml   Net             -475 ml       Core Measures & Quality Metrics  Labs reviewed, Medications reviewed and Radiology images reviewed  Grande Catheter: Continue until more alert.      DVT Prophylaxis: Contraindicated - High bleeding risk  DVT prophylaxis - mechanical: SCDs  Ulcer prophylaxis: Yes    Assessed for rehab: Patient unable to tolerate rehabilitation therapeutic regimen    Total Score: 11    ETOH Screening     Reason for no ETOH Intervention: Traumatic Brain Injury (Mental acuity)  ETOH Screening     Intervention complete date: 2/23/2019        Assessment/Plan  Hypokalemia   Assessment & Plan    Replete and trend     Palliative care encounter- (present on admission)   Assessment & Plan    2/23 Palliative care consult for advance care planning and POLST completion.     Encounter for geriatric assessment- (present on admission)   Assessment & Plan    2/23 Geriatric consult      Traumatic brain injury (HCC)- (present on admission)   Assessment & Plan    Acute subdural vs epidural hemorrhage in the left parietal region with the greatest transverse diameter of 1.2cm causing mild local mass effect  Repeat interval CT imaging unchanged.  Non-operative management.   Post traumatic pharmacologic seizure prophylaxis for 1 week.  Andrea Alejo MD. Neurosurgery.     Fracture of second cervical vertebra (HCC)- (present on admission)   Assessment & Plan    Nondisplaced oblique fracture mid dens of C2.  Non-operative management.   Aspen collar on at all times for 6 weeks.  Andrea Alejo MD. Neurosurgery.     Discharge planning issues   Assessment & Plan    2/25 Skilled referral placed.  2/26 Pending accepting facility      Family history  of Alzheimer's disease- (present on admission)   Assessment & Plan    Premorbid.  Lives with daughter.  Ambulates with walker, has fallen a couple of times due to unsteady gait, able to complete all self care activities.  Normalize sleep/wake cycles.     Contraindication to deep vein thrombosis (DVT) prophylaxis- (present on admission)   Assessment & Plan    Systemic anticoagulation contraindicated secondary to elevated bleeding risk.  RAP score 11.  2/23 Surveillance venous duplex scanning ordered.  2/24 Trauma screening bilateral lower extremity venous duplex negative for above knee DVT.     Platelet dysfunction due to drugs- (present on admission)   Assessment & Plan    History of ASA and Plavix for TIA.  Admission TEG with platelet mapping demonstrated prolonged AA.  2/22 Transfused 1 unit platelets.  Repeat TEG with 88.8% AA inhibition. Repeat imaging unchanged so no further platelet transfusion.  Hold ASA and Plavix for 6 weeks per neurosurgery.     Scalp laceration- (present on admission)   Assessment & Plan    Right scalp laceration.  Stapled repair completed in ED.  Plan for staple removal at 7 days (3/1).     Trauma- (present on admission)   Assessment & Plan    Ground level fall, unknown LOC.  Trauma Yellow Transfer Activation.  Smiley Collins MD. Trauma Surgery.     Essential hypertension- (present on admission)   Assessment & Plan    Chronic condition treated with Hyzaar.  2/23 Resumed maintenance medication.         Discussed patient condition with RN, Patient and trauma surgery.

## 2019-02-26 NOTE — ASSESSMENT & PLAN NOTE
2/25 Skilled referral placed.  2/26 Pending accepting facility  Admission date  2/22/2019    Transfer from SICU      Rehab/SNF consult 2/25/19     Medically cleared for discharge 2/25/19      Not discharged: 3/4  Reasons:  Delay in Discharge Plan and hospital bed ordered over 72 hours ago still in process  Discharged Date 3/4/19

## 2019-02-27 LAB
ANION GAP SERPL CALC-SCNC: 7 MMOL/L (ref 0–11.9)
BASOPHILS # BLD AUTO: 0.5 % (ref 0–1.8)
BASOPHILS # BLD: 0.04 K/UL (ref 0–0.12)
BUN SERPL-MCNC: 14 MG/DL (ref 8–22)
CALCIUM SERPL-MCNC: 9.7 MG/DL (ref 8.5–10.5)
CHLORIDE SERPL-SCNC: 107 MMOL/L (ref 96–112)
CO2 SERPL-SCNC: 23 MMOL/L (ref 20–33)
CREAT SERPL-MCNC: 0.95 MG/DL (ref 0.5–1.4)
EOSINOPHIL # BLD AUTO: 0.13 K/UL (ref 0–0.51)
EOSINOPHIL NFR BLD: 1.6 % (ref 0–6.9)
ERYTHROCYTE [DISTWIDTH] IN BLOOD BY AUTOMATED COUNT: 49.7 FL (ref 35.9–50)
GLUCOSE SERPL-MCNC: 129 MG/DL (ref 65–99)
HCT VFR BLD AUTO: 35.8 % (ref 37–47)
HGB BLD-MCNC: 11.5 G/DL (ref 12–16)
IMM GRANULOCYTES # BLD AUTO: 0.04 K/UL (ref 0–0.11)
IMM GRANULOCYTES NFR BLD AUTO: 0.5 % (ref 0–0.9)
LYMPHOCYTES # BLD AUTO: 1.75 K/UL (ref 1–4.8)
LYMPHOCYTES NFR BLD: 21.1 % (ref 22–41)
MCH RBC QN AUTO: 30.9 PG (ref 27–33)
MCHC RBC AUTO-ENTMCNC: 32.1 G/DL (ref 33.6–35)
MCV RBC AUTO: 96.2 FL (ref 81.4–97.8)
MONOCYTES # BLD AUTO: 0.87 K/UL (ref 0–0.85)
MONOCYTES NFR BLD AUTO: 10.5 % (ref 0–13.4)
NEUTROPHILS # BLD AUTO: 5.46 K/UL (ref 2–7.15)
NEUTROPHILS NFR BLD: 65.8 % (ref 44–72)
NRBC # BLD AUTO: 0 K/UL
NRBC BLD-RTO: 0 /100 WBC
PLATELET # BLD AUTO: 135 K/UL (ref 164–446)
PMV BLD AUTO: 12.6 FL (ref 9–12.9)
POTASSIUM SERPL-SCNC: 3.2 MMOL/L (ref 3.6–5.5)
RBC # BLD AUTO: 3.72 M/UL (ref 4.2–5.4)
SODIUM SERPL-SCNC: 137 MMOL/L (ref 135–145)
WBC # BLD AUTO: 8.3 K/UL (ref 4.8–10.8)

## 2019-02-27 PROCEDURE — 85025 COMPLETE CBC W/AUTO DIFF WBC: CPT

## 2019-02-27 PROCEDURE — 80048 BASIC METABOLIC PNL TOTAL CA: CPT

## 2019-02-27 PROCEDURE — A9270 NON-COVERED ITEM OR SERVICE: HCPCS | Performed by: NURSE PRACTITIONER

## 2019-02-27 PROCEDURE — 700102 HCHG RX REV CODE 250 W/ 637 OVERRIDE(OP): Performed by: NURSE PRACTITIONER

## 2019-02-27 PROCEDURE — 700105 HCHG RX REV CODE 258: Performed by: NEUROLOGICAL SURGERY

## 2019-02-27 PROCEDURE — A9270 NON-COVERED ITEM OR SERVICE: HCPCS | Performed by: SURGERY

## 2019-02-27 PROCEDURE — 99232 SBSQ HOSP IP/OBS MODERATE 35: CPT | Performed by: INTERNAL MEDICINE

## 2019-02-27 PROCEDURE — 700102 HCHG RX REV CODE 250 W/ 637 OVERRIDE(OP): Performed by: SURGERY

## 2019-02-27 PROCEDURE — 770001 HCHG ROOM/CARE - MED/SURG/GYN PRIV*

## 2019-02-27 PROCEDURE — 36415 COLL VENOUS BLD VENIPUNCTURE: CPT

## 2019-02-27 PROCEDURE — 97530 THERAPEUTIC ACTIVITIES: CPT

## 2019-02-27 PROCEDURE — 700111 HCHG RX REV CODE 636 W/ 250 OVERRIDE (IP): Performed by: NEUROLOGICAL SURGERY

## 2019-02-27 PROCEDURE — 700101 HCHG RX REV CODE 250: Performed by: NURSE PRACTITIONER

## 2019-02-27 PROCEDURE — 97535 SELF CARE MNGMENT TRAINING: CPT

## 2019-02-27 PROCEDURE — A9270 NON-COVERED ITEM OR SERVICE: HCPCS | Performed by: INTERNAL MEDICINE

## 2019-02-27 PROCEDURE — 700102 HCHG RX REV CODE 250 W/ 637 OVERRIDE(OP): Performed by: INTERNAL MEDICINE

## 2019-02-27 RX ORDER — LEVETIRACETAM 100 MG/ML
500 SOLUTION ORAL 2 TIMES DAILY
Status: DISCONTINUED | OUTPATIENT
Start: 2019-02-27 | End: 2019-02-27

## 2019-02-27 RX ORDER — LEVETIRACETAM 250 MG/1
500 TABLET ORAL 2 TIMES DAILY
Status: DISCONTINUED | OUTPATIENT
Start: 2019-02-27 | End: 2019-02-27

## 2019-02-27 RX ORDER — LEVETIRACETAM 250 MG/1
500 TABLET ORAL 2 TIMES DAILY
Status: COMPLETED | OUTPATIENT
Start: 2019-02-27 | End: 2019-03-01

## 2019-02-27 RX ORDER — LOPERAMIDE HYDROCHLORIDE 2 MG/1
2 CAPSULE ORAL ONCE
Status: COMPLETED | OUTPATIENT
Start: 2019-02-27 | End: 2019-02-27

## 2019-02-27 RX ORDER — LEVETIRACETAM 100 MG/ML
500 SOLUTION ORAL 2 TIMES DAILY
Status: COMPLETED | OUTPATIENT
Start: 2019-02-27 | End: 2019-03-01

## 2019-02-27 RX ORDER — POTASSIUM CHLORIDE 20 MEQ/1
20 TABLET, EXTENDED RELEASE ORAL DAILY
Status: DISCONTINUED | OUTPATIENT
Start: 2019-02-27 | End: 2019-03-04 | Stop reason: HOSPADM

## 2019-02-27 RX ADMIN — ACETAMINOPHEN 1000 MG: 500 TABLET ORAL at 16:59

## 2019-02-27 RX ADMIN — LEVETIRACETAM 500 MG: 250 TABLET, FILM COATED ORAL at 16:59

## 2019-02-27 RX ADMIN — ACETAMINOPHEN 1000 MG: 500 TABLET ORAL at 04:25

## 2019-02-27 RX ADMIN — SODIUM CHLORIDE 500 MG: 9 INJECTION, SOLUTION INTRAVENOUS at 05:30

## 2019-02-27 RX ADMIN — FAMOTIDINE 20 MG: 20 TABLET ORAL at 04:25

## 2019-02-27 RX ADMIN — POTASSIUM CHLORIDE 20 MEQ: 1500 TABLET, EXTENDED RELEASE ORAL at 09:30

## 2019-02-27 RX ADMIN — POTASSIUM CHLORIDE, DEXTROSE MONOHYDRATE AND SODIUM CHLORIDE: 150; 5; 450 INJECTION, SOLUTION INTRAVENOUS at 13:27

## 2019-02-27 RX ADMIN — HYDROCHLOROTHIAZIDE 12.5 MG: 25 TABLET ORAL at 04:25

## 2019-02-27 RX ADMIN — ACETAMINOPHEN 1000 MG: 500 TABLET ORAL at 12:00

## 2019-02-27 RX ADMIN — LOSARTAN POTASSIUM 100 MG: 50 TABLET ORAL at 04:25

## 2019-02-27 RX ADMIN — LOPERAMIDE HYDROCHLORIDE 2 MG: 2 CAPSULE ORAL at 11:58

## 2019-02-27 RX ADMIN — ESCITALOPRAM OXALATE 10 MG: 10 TABLET ORAL at 04:25

## 2019-02-27 ASSESSMENT — COGNITIVE AND FUNCTIONAL STATUS - GENERAL
TOILETING: A LOT
SUGGESTED CMS G CODE MODIFIER DAILY ACTIVITY: CK
EATING MEALS: A LOT
HELP NEEDED FOR BATHING: A LOT
DAILY ACTIVITIY SCORE: 14
DRESSING REGULAR LOWER BODY CLOTHING: A LOT
PERSONAL GROOMING: A LITTLE
DRESSING REGULAR UPPER BODY CLOTHING: A LITTLE

## 2019-02-27 ASSESSMENT — ENCOUNTER SYMPTOMS: FEVER: 0

## 2019-02-27 NOTE — DISCHARGE PLANNING
Anticipated Discharge Disposition:   SNF    Action:    -Spoke with patient's sisters and they received a telephone call the previous night from an unknown male caller who stated that they needed to move their family member asap.    -RN FELICIA assured them that we were waiting for SNF acceptance.  Provided list of group homes and assisted living facilities.  The after plan for patient dependent upon patient's progress.  Family is agreeable to SNF and GH or DONTE afterwards.  They stated that patient has approx $2,200.00/month income.    Barriers to Discharge:    SNF acceptance    Plan:    Wait for SNF acceptance.

## 2019-02-27 NOTE — THERAPY
"Physical Therapy Treatment completed.   Bed Mobility:  Supine to Sit: Maximal Assist  Transfers: Sit to Stand: Maximal Assist  Gait: Level Of Assist: Unable to Participate with No Equipment Needed       Plan of Care: Will benefit from Physical Therapy 4 times per week  Discharge Recommendations: Equipment: Will Continue to Assess for Equipment Needs. Post-acute therapy Recommend inpatient transitional care services for continued physical therapy services.      Pt found supine in bed with c collar over mouth. Re-fit collar to properly fit patient. Significant improvement in balance when sitting EOB. As pt fatigued gradual lean R but able to self correct oncen provided with cues. Attempted sit to stand, poor ability to shift weight forward and use LEs. No reports of pain during treatment. Continue to recommend post acute rehabiliaton. Acute PT to continue to follow while in house.     See \"Rehab Therapy-Acute\" Patient Summary Report for complete documentation.         "

## 2019-02-27 NOTE — PROGRESS NOTES
"Geriatric Progress Note:  2/27/2019    CC: GLF at home    S: Patient is improved mentation today however is continues to be confused.  We will to have a complete conversation.  Patient has continued issues diarrhea.  Nursing suggest she has a very relaxed external anal sphincter.  This diarrhea is a common issue that was going on at home.  Spoke with family in the room today the requested loperamide be used.    ROS: The patient denied any pain, says everything was moving well, she was unaware of the Seroquel collar around her neck but otherwiseunable to obtain meaningful review of systems due to patient's altered mental status    O:/68   Pulse 77   Temp 36.8 °C (98.3 °F) (Temporal)   Resp 16   Ht 1.6 m (5' 3\")   Wt 59.2 kg (130 lb 8.2 oz)   SpO2 95%   Breastfeeding? No   BMI 23.12 kg/m²   General: No acute distress, laying in bed, drowsy, oriented to self only  Eyes: No blepharitis noted  Neck, hard c-collar in place  Neuro: Able to move all 4 extremities to command  Abdomen: Soft nontender nondistended   Lungs: Clear to auscultation anteriorly, normal effort  Cardiovascular: Regular rate and rhythm 2+ radial pulses bilaterally 1+ DP pulses bilateral  Delirium Screen: Positive based on the mid observation that the patient is acutely different than baseline, however her speech is much better, more organized    Labs/Imaging/EKG: CBC, BMP,    Assessment: 87-year-old female past medical history dementia admitted after ground-level fall with resultant C2 vertebral fracture and subdural hematoma, course completed by delirium        Plan:  Delirium  Hypokalemia  -Hypoactive, based on family reports of acute changes  -Resolved/resolving now with improving mentation  -Likely multifactorial the use of Ativan on admission did not likely help this patient's case  -No obvious current signs of infection we will continue to diligently watch for these, patient had fever on admission however this is resolved and can be " seen with head injurys  -Treatment of hypokalemia as you are  -Pain treatment as below  -Mobility to maximum level patient was independently mobile with a walker prior to admission and we should aim for this  -If patient is unable to swallow, and her delirium does not clear that portends a even more limited prognosis as family is not wishing to pursue tube feeding  -Watch for constipation, diarrhea as an outpatient, continue to mointior  -Patient was filling with a peripheral IV today all medications will be transitioned to oral and peripheral IVs removed.    Diarrhea  -Chronic per family.  -Nursing noted patient has minimal rectal tone suggesting chronic fecal leakage  -We will provide loperamide 2 mg x1  -Discontinue bowel protocol, she is not received much while in house anyway.   - Monitor for constipation     Dementia  -Unclear subtype, family reports multiple histories of TIAs however this MRI did not showed old strokes, no history of coronary artery disease  -Likely Alzheimer's dementia based on history  -On memantine 5 mg at home, continue to hold, underwent shared decision making with family on 2/27/2019  -TSH and B12 are normal  -Appreciate family's assistance, the presence will likely improve the patient's stay  -Mobilize patient, patient is already delirious, will need to minimize further issues with this, consider recommend additions below     Hypertension  -Use of hydrochlorthiazide likely potential reason for hypokalemia, consider pain as a reason for hypertension rather than true essential hypertension.  -Once patient is able to take p.o. medications consider low-dose oxycodone 2.5 mg to address her pain and uptitrate as needed, watching mentation     Recurrent falls  Osteoporosis  -Patient's family reports history consistent with seizure however it seems that they get this worked up.  She is on prophylactic Keppra due to her subdural hematoma, per neurosurgery.  -Encourage mobility, she is on  calcitonin as an outpatient unclear why she is on this rather than a bisphosphonate.  -Vitamin D level normal     Depression  - on home 10 mg escitalopram as patient is able to take oral medication     Disposition  -Family would like to go to skilled nursing facility, PT OT SLP to see patient in house     C2 vertebral fx  Subdural hematoma  -Management per neurosurgery, trauma  -Schedule Tylenol  -Consider more aggressive pain treatment based on response to Tylenol.  Currently appears to be stable  -Transition to oral Keppra     Polypharmacy  -Discontinue famotidined discussion with trauma.     Interventions to be considered in all patients in order to minimize the risk of delirium.   -do not disturb patient (vitals or lab draws) between the hours of 10 PM and 6 AM.  -ideally the patient should not sleep during the day and we should avoid day time naps.   -up in chair for meals  -ambulate at least three times daily, as able  -watch for constipation  -timed voiding - ask patient is she would like to go to the bathroom q 2-3 hours, except during the do not disturb hours.   -remove all necessary lines (central lines, peripheral IVs, feeding tubes, abrams catheters)  -unless patient has shown harm to self or others I would recommend against use of restraints - either chemical or physical (antipsychotics)   -minimize polypharmacy, do not dose medication during sleep hours      Thank you for including us in the care of this patient. We will follow along tomorrow.     Alan Patel M.D.  Geriatric Physician   Can be reached at extension: [phone]  Monday - Friday 8-5      This note was partially complete completed using voice recognition software.  I did my best to correct errors prior to submitting this note, but for any concerns please do not hesitate to contact me.

## 2019-02-27 NOTE — DISCHARGE PLANNING
Anticipated Discharge Disposition:   SNF    Action:   Called deandra Jose and notified her that the initial 4 choices for SNF have declined. Consent given to look into the rest of the SNF in the [facility] area. Faxed choice to [facility].     PASRR under manual review    Addendum:  Fernanda from [employer] [facility] ( [facility],[place], [place] and [facility] ) In room talking to family.       Daughter Marissa is looking for  placement and found [phone] for the amount of $2300 a month. Marissa said that the family is unable to take care of pt so they really want pt to go to .    Addendum:  Talked to   Diamond at [facility] , he will be here to screen pt.     Barriers to Discharge:   Placement    Plan:   Follow up with [facility].    Addendum:  Diamond from [facility] here to screen pt. He declined as pt was continously trying to get oob while we were in room.    Paulina from [facility] is also coming to screen pt for admission in their .

## 2019-02-27 NOTE — DISCHARGE PLANNING
Received Choice form at 0905.  Agency/Facility Name: [facility], [facility], [facility], and [facility].  Referral sent per Choice form at 0910.

## 2019-02-27 NOTE — DISCHARGE PLANNING
Agency/Facility Name: [facility]   Spoke To: Nidia   Outcome: Patient declined due to being unable to participate in therapy.

## 2019-02-27 NOTE — PROGRESS NOTES
Bedside rounding complete. Patient a & o x self only. Per night shift, the patient was trying to get out of bed and had diarrhea frequently though out the night. 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 Barrier wipes and cream used frequently. Waffle cushion in place. Pt refuses SCDs. Pt voiding without difficulty. Bed and frame alarm on, upper side rails up, bed locked and in lowest position. Call bell and belongings within reach. Communication board updated and plan of care discussed with the patient. Patient educated on hourly rounding.

## 2019-02-27 NOTE — PROGRESS NOTES
RN MOBILITY NOTE     Surgery patient?: no  Date of surgery: n/a  Ambulated 50 ft on day of surgery? (N/A if today is not date of surgery): n/a  Number of times ambulated 50 feet or greater today: 0  Patient has been up to chair, edge of bed or HOB 90 degrees for all meals?: yes  Goal met? (goal is ambulating at least 50 feet 2 times on day shift, one time on night shift): no  If patient did not meet mobility goal, why?: Pt too weak too ambulate. Attempted with 2 staff

## 2019-02-28 LAB
ANION GAP SERPL CALC-SCNC: 8 MMOL/L (ref 0–11.9)
BASOPHILS # BLD AUTO: 0.5 % (ref 0–1.8)
BASOPHILS # BLD: 0.04 K/UL (ref 0–0.12)
BUN SERPL-MCNC: 13 MG/DL (ref 8–22)
CALCIUM SERPL-MCNC: 9.5 MG/DL (ref 8.5–10.5)
CHLORIDE SERPL-SCNC: 107 MMOL/L (ref 96–112)
CO2 SERPL-SCNC: 26 MMOL/L (ref 20–33)
CREAT SERPL-MCNC: 0.96 MG/DL (ref 0.5–1.4)
EOSINOPHIL # BLD AUTO: 0.24 K/UL (ref 0–0.51)
EOSINOPHIL NFR BLD: 3.1 % (ref 0–6.9)
ERYTHROCYTE [DISTWIDTH] IN BLOOD BY AUTOMATED COUNT: 48.2 FL (ref 35.9–50)
GLUCOSE SERPL-MCNC: 100 MG/DL (ref 65–99)
HCT VFR BLD AUTO: 35.8 % (ref 37–47)
HGB BLD-MCNC: 11.9 G/DL (ref 12–16)
IMM GRANULOCYTES # BLD AUTO: 0.01 K/UL (ref 0–0.11)
IMM GRANULOCYTES NFR BLD AUTO: 0.1 % (ref 0–0.9)
LYMPHOCYTES # BLD AUTO: 2.32 K/UL (ref 1–4.8)
LYMPHOCYTES NFR BLD: 29.8 % (ref 22–41)
MAGNESIUM SERPL-MCNC: 1.9 MG/DL (ref 1.5–2.5)
MCH RBC QN AUTO: 31.3 PG (ref 27–33)
MCHC RBC AUTO-ENTMCNC: 33.2 G/DL (ref 33.6–35)
MCV RBC AUTO: 94.2 FL (ref 81.4–97.8)
MONOCYTES # BLD AUTO: 0.84 K/UL (ref 0–0.85)
MONOCYTES NFR BLD AUTO: 10.8 % (ref 0–13.4)
NEUTROPHILS # BLD AUTO: 4.33 K/UL (ref 2–7.15)
NEUTROPHILS NFR BLD: 55.7 % (ref 44–72)
NRBC # BLD AUTO: 0 K/UL
NRBC BLD-RTO: 0 /100 WBC
PHOSPHATE SERPL-MCNC: 2.7 MG/DL (ref 2.5–4.5)
PLATELET # BLD AUTO: 143 K/UL (ref 164–446)
PMV BLD AUTO: 12.2 FL (ref 9–12.9)
POTASSIUM SERPL-SCNC: 3.3 MMOL/L (ref 3.6–5.5)
RBC # BLD AUTO: 3.8 M/UL (ref 4.2–5.4)
SODIUM SERPL-SCNC: 141 MMOL/L (ref 135–145)
WBC # BLD AUTO: 7.8 K/UL (ref 4.8–10.8)

## 2019-02-28 PROCEDURE — 700102 HCHG RX REV CODE 250 W/ 637 OVERRIDE(OP): Performed by: NURSE PRACTITIONER

## 2019-02-28 PROCEDURE — 86480 TB TEST CELL IMMUN MEASURE: CPT

## 2019-02-28 PROCEDURE — 84100 ASSAY OF PHOSPHORUS: CPT

## 2019-02-28 PROCEDURE — 80048 BASIC METABOLIC PNL TOTAL CA: CPT

## 2019-02-28 PROCEDURE — 700102 HCHG RX REV CODE 250 W/ 637 OVERRIDE(OP): Performed by: INTERNAL MEDICINE

## 2019-02-28 PROCEDURE — 83735 ASSAY OF MAGNESIUM: CPT

## 2019-02-28 PROCEDURE — 770001 HCHG ROOM/CARE - MED/SURG/GYN PRIV*

## 2019-02-28 PROCEDURE — 97116 GAIT TRAINING THERAPY: CPT

## 2019-02-28 PROCEDURE — A9270 NON-COVERED ITEM OR SERVICE: HCPCS | Performed by: INTERNAL MEDICINE

## 2019-02-28 PROCEDURE — 99231 SBSQ HOSP IP/OBS SF/LOW 25: CPT | Performed by: INTERNAL MEDICINE

## 2019-02-28 PROCEDURE — 36415 COLL VENOUS BLD VENIPUNCTURE: CPT

## 2019-02-28 PROCEDURE — A9270 NON-COVERED ITEM OR SERVICE: HCPCS | Performed by: NURSE PRACTITIONER

## 2019-02-28 PROCEDURE — 97530 THERAPEUTIC ACTIVITIES: CPT

## 2019-02-28 PROCEDURE — 85025 COMPLETE CBC W/AUTO DIFF WBC: CPT

## 2019-02-28 RX ORDER — TRAZODONE HYDROCHLORIDE 50 MG/1
25 TABLET ORAL
Status: DISCONTINUED | OUTPATIENT
Start: 2019-02-28 | End: 2019-03-04 | Stop reason: HOSPADM

## 2019-02-28 RX ORDER — DIPHENHYDRAMINE HCL 25 MG
25-50 TABLET ORAL EVERY 6 HOURS PRN
Status: DISCONTINUED | OUTPATIENT
Start: 2019-02-28 | End: 2019-02-28

## 2019-02-28 RX ADMIN — HYDROCHLOROTHIAZIDE 12.5 MG: 25 TABLET ORAL at 04:26

## 2019-02-28 RX ADMIN — LEVETIRACETAM 500 MG: 100 SOLUTION ORAL at 04:27

## 2019-02-28 RX ADMIN — POTASSIUM CHLORIDE 20 MEQ: 1500 TABLET, EXTENDED RELEASE ORAL at 04:26

## 2019-02-28 RX ADMIN — ACETAMINOPHEN 1000 MG: 500 TABLET ORAL at 04:26

## 2019-02-28 RX ADMIN — ACETAMINOPHEN 1000 MG: 500 TABLET ORAL at 12:23

## 2019-02-28 RX ADMIN — LEVETIRACETAM 500 MG: 250 TABLET, FILM COATED ORAL at 17:56

## 2019-02-28 RX ADMIN — TRAZODONE HYDROCHLORIDE 25 MG: 50 TABLET ORAL at 21:20

## 2019-02-28 RX ADMIN — ESCITALOPRAM OXALATE 10 MG: 10 TABLET ORAL at 04:26

## 2019-02-28 RX ADMIN — LOSARTAN POTASSIUM 100 MG: 50 TABLET ORAL at 04:26

## 2019-02-28 RX ADMIN — ACETAMINOPHEN 1000 MG: 500 TABLET ORAL at 17:55

## 2019-02-28 ASSESSMENT — ENCOUNTER SYMPTOMS: FEVER: 0

## 2019-02-28 ASSESSMENT — GAIT ASSESSMENTS
DISTANCE (FEET): 5
GAIT LEVEL OF ASSIST: MODERATE ASSIST
ASSISTIVE DEVICE: FRONT WHEEL WALKER
DEVIATION: DECREASED BASE OF SUPPORT;BRADYKINETIC;SHUFFLED GAIT;OTHER (COMMENT)

## 2019-02-28 ASSESSMENT — COGNITIVE AND FUNCTIONAL STATUS - GENERAL
MOVING TO AND FROM BED TO CHAIR: A LOT
SUGGESTED CMS G CODE MODIFIER MOBILITY: CL
STANDING UP FROM CHAIR USING ARMS: A LOT
MOVING FROM LYING ON BACK TO SITTING ON SIDE OF FLAT BED: UNABLE
MOBILITY SCORE: 10
WALKING IN HOSPITAL ROOM: A LOT
CLIMB 3 TO 5 STEPS WITH RAILING: TOTAL
TURNING FROM BACK TO SIDE WHILE IN FLAT BAD: A LOT

## 2019-02-28 NOTE — DISCHARGE PLANNING
Anticipated Discharge Disposition:   [facility]  No SNF acceptance    Action:   Received a call from [facility]  for [facility] who confirmed that they are accepting pt . Attempted to call daughter but no answer.  Per CM conversation with daughters, they are interested in homehealth vs hospice referral whichever is appropriate for pt.     APRN agreeable to place order for HH.   Choice made for [facility] due to pt's insurance. ( [employer]) F[facility]ed to [facility].  Daughters aware that [facility] has accepted but [facility] has declined.   They are going to see [facility]. Provided contact number of [facility] and the address. ( [phone] , address: [address] [place] [address] ).       Barriers to Discharge:   Placement  Pending HH vs Hospice acceptance    Plan:   Update family.   Update APRN.

## 2019-02-28 NOTE — FACE TO FACE
Face to Face Note  -  Durable Medical Equipment    REGLA Balbuena - NPI: [id number]  I certify that this patient is under my care and that they had a durable medical equipment(DME)face to face encounter by myself that meets the physician DME face-to-face encounter requirements with this patient on:    Date of encounter:   Patient:                    MRN:                       YOB: 2019  Shirley ANA Hearn  [id number]  11/25/1931     The encounter with the patient was in whole, or in part, for the following medical condition, which is the primary reason for durable medical equipment:  Other - intracranial hemorrage     I certify that, based on my findings, the following durable medical equipment is medically necessary:  Beds.    My Clinical findings support the need for the above equipment due to:  Abnormal Gait, Frequent Falls, Mental Status

## 2019-02-28 NOTE — DISCHARGE PLANNING
Agency/Facility Name: [facility]   Outcome: Voicemail left for Dinorah regarding status of patient's home health referral.

## 2019-02-28 NOTE — DISCHARGE PLANNING
Agency/Facility Name: [facility]   Spoke To: Dinorah   Outcome: Patient accepted. RN FELICIA Jay notified of acceptance.

## 2019-02-28 NOTE — THERAPY
"Physical Therapy Treatment completed.   Bed Mobility:  Supine to Sit: Minimal Assist  Transfers: Sit to Stand: Minimal Assist  Gait: Level Of Assist: Moderate Assist with Front-Wheel Walker       Plan of Care: Will benefit from Physical Therapy 4 times per week  Discharge Recommendations: Equipment: Will Continue to Assess for Equipment Needs. Post-acute therapy Recommend inpatient transitional care services for continued physical therapy services.       See \"Rehab Therapy-Acute\" Patient Summary Report for complete documentation.     Pt contines to progress w/ therapy. Pt presenting w/ improved strength to perform mobility. Pt was able to perform bed mobility and STS at [facility]. With standing, pt demonstrates a posterior lean. She was able to correct w/ cues to raise up on her toes however reverted back to posterior lean quickly. Pt was able to take small steps w/ therapist preventing posterior lean. Pt continues to be at a level in which she will benefit from post acute therapy.  "

## 2019-02-28 NOTE — PROGRESS NOTES
Trauma / Surgical Daily Progress Note    Date of Service  2/28/2019    Chief Complaint  87 y.o. female admitted 2/22/2019 with Subdural hematoma (HCC)  GLF    Interval Events  Awaiting placement  Nursing keeping pt OOB all day, still not sleeping at night  Added benadryl qhs   Looking at group home with Home health for disposition.    Review of Systems  Review of Systems   Unable to perform ROS: Mental acuity   Constitutional: Negative for fever.   Gastrointestinal:        Last bowel movement 2/27   Genitourinary:        Voiding          Vital Signs  Temp:  [36.2 °C (97.2 °F)-36.9 °C (98.5 °F)] 36.9 °C (98.5 °F)  Pulse:  [] 103  Resp:  [16-18] 18  BP: (109-168)/(68-81) 121/81  SpO2:  [92 %-97 %] 97 %    Physical Exam  Physical Exam   Constitutional: She appears well-developed and well-nourished. She is easily aroused. No distress. Cervical collar in place.   Elderly   HENT:   Head: Normocephalic.   Eyes: Conjunctivae are normal.   Neck: Neck supple.   Cardiovascular: Normal rate.    Pulmonary/Chest: Effort normal. No respiratory distress.   Abdominal: Soft. Bowel sounds are normal. She exhibits no distension.   Musculoskeletal: She exhibits no edema or tenderness.   Moves all extremities   Neurological: She is easily aroused. GCS eye subscore is 3. GCS verbal subscore is 3. GCS motor subscore is 5.   A & O to self only   Skin: Skin is warm and dry. No pallor.   Nursing note and vitals reviewed.      Laboratory  Recent Results (from the past 24 hour(s))   Basic Metabolic Panel    Collection Time: 02/28/19  3:09 AM   Result Value Ref Range    Sodium 141 135 - 145 mmol/L    Potassium 3.3 (L) 3.6 - 5.5 mmol/L    Chloride 107 96 - 112 mmol/L    Co2 26 20 - 33 mmol/L    Glucose 100 (H) 65 - 99 mg/dL    Bun 13 8 - 22 mg/dL    Creatinine 0.96 0.50 - 1.40 mg/dL    Calcium 9.5 8.5 - 10.5 mg/dL    Anion Gap 8.0 0.0 - 11.9   CBC WITH DIFFERENTIAL    Collection Time: 02/28/19  3:09 AM   Result Value Ref Range    WBC 7.8  4.8 - 10.8 K/uL    RBC 3.80 (L) 4.20 - 5.40 M/uL    Hemoglobin 11.9 (L) 12.0 - 16.0 g/dL    Hematocrit 35.8 (L) 37.0 - 47.0 %    MCV 94.2 81.4 - 97.8 fL    MCH 31.3 27.0 - 33.0 pg    MCHC 33.2 (L) 33.6 - 35.0 g/dL    RDW 48.2 35.9 - 50.0 fL    Platelet Count 143 (L) 164 - 446 K/uL    MPV 12.2 9.0 - 12.9 fL    Neutrophils-Polys 55.70 44.00 - 72.00 %    Lymphocytes 29.80 22.00 - 41.00 %    Monocytes 10.80 0.00 - 13.40 %    Eosinophils 3.10 0.00 - 6.90 %    Basophils 0.50 0.00 - 1.80 %    Immature Granulocytes 0.10 0.00 - 0.90 %    Nucleated RBC 0.00 /100 WBC    Neutrophils (Absolute) 4.33 2.00 - 7.15 K/uL    Lymphs (Absolute) 2.32 1.00 - 4.80 K/uL    Monos (Absolute) 0.84 0.00 - 0.85 K/uL    Eos (Absolute) 0.24 0.00 - 0.51 K/uL    Baso (Absolute) 0.04 0.00 - 0.12 K/uL    Immature Granulocytes (abs) 0.01 0.00 - 0.11 K/uL    NRBC (Absolute) 0.00 K/uL   Magnesium: Every Monday and Thursday AM    Collection Time: 02/28/19  3:09 AM   Result Value Ref Range    Magnesium 1.9 1.5 - 2.5 mg/dL   Phosphorus: Every Monday and Thursday AM    Collection Time: 02/28/19  3:09 AM   Result Value Ref Range    Phosphorus 2.7 2.5 - 4.5 mg/dL   ESTIMATED GFR    Collection Time: 02/28/19  3:09 AM   Result Value Ref Range    GFR If African American >60 >60 mL/min/1.73 m 2    GFR If Non African American 55 (A) >60 mL/min/1.73 m 2       Fluids    Intake/Output Summary (Last 24 hours) at 02/28/19 0731  Last data filed at 02/27/19 1922   Gross per 24 hour   Intake              425 ml   Output                0 ml   Net              425 ml       Core Measures & Quality Metrics  Labs reviewed, Medications reviewed and Radiology images reviewed  Grande Catheter: Continue until more alert.      DVT Prophylaxis: Contraindicated - High bleeding risk  DVT prophylaxis - mechanical: SCDs  Ulcer prophylaxis: Yes    Assessed for rehab: Patient unable to tolerate rehabilitation therapeutic regimen    Total Score: 11    ETOH Screening     Reason for no ETOH  Intervention: Traumatic Brain Injury (Mental acuity)  ETOH Screening     Intervention complete date: 2/23/2019        Assessment/Plan  Hypokalemia   Assessment & Plan    Replete and trend  Kdur q day ordered     Palliative care encounter- (present on admission)   Assessment & Plan    2/23 Palliative care consult for advance care planning and POLST completion.     Encounter for geriatric assessment- (present on admission)   Assessment & Plan    2/23 Geriatric consult   Recommendations appreciated and implemented     Traumatic brain injury (HCC)- (present on admission)   Assessment & Plan    Acute subdural vs epidural hemorrhage in the left parietal region with the greatest transverse diameter of 1.2cm causing mild local mass effect  Repeat interval CT imaging unchanged.  Non-operative management.   Post traumatic pharmacologic seizure prophylaxis for 1 week (2/28).  Andrea Alejo MD. Neurosurgery.     Fracture of second cervical vertebra (HCC)- (present on admission)   Assessment & Plan    Nondisplaced oblique fracture mid dens of C2.  Non-operative management.   Aspen collar on at all times for 6 weeks  Andrea Alejo MD. Neurosurgery.     Discharge planning issues   Assessment & Plan    2/25 Skilled referral placed.  2/26 Pending accepting facility      Family history of Alzheimer's disease- (present on admission)   Assessment & Plan    Premorbid.  Lives with daughter.  Ambulates with walker, has fallen a couple of times due to unsteady gait, able to complete all self care activities.  Normalize sleep/wake cycles.     Contraindication to deep vein thrombosis (DVT) prophylaxis- (present on admission)   Assessment & Plan    Systemic anticoagulation contraindicated secondary to elevated bleeding risk.  RAP score 11.  2/23 Surveillance venous duplex scanning ordered.  2/24 Trauma screening bilateral lower extremity venous duplex negative for above knee DVT.     Platelet dysfunction due to drugs- (present on admission)    Assessment & Plan    History of ASA and Plavix for TIA.  Admission TEG with platelet mapping demonstrated prolonged AA.  2/22 Transfused 1 unit platelets.  Repeat TEG with 88.8% AA inhibition. Repeat imaging unchanged so no further platelet transfusion.  Hold ASA and Plavix for 6 weeks per neurosurgery.     Scalp laceration- (present on admission)   Assessment & Plan    Right scalp laceration.  Stapled repair completed in ED.  Plan for staple removal at 7 days (3/1).     Trauma- (present on admission)   Assessment & Plan    Ground level fall, unknown LOC.  Trauma Yellow Transfer Activation.  Smiley Collins MD. Trauma Surgery.     Essential hypertension- (present on admission)   Assessment & Plan    Chronic condition treated with Hyzaar.  2/23 Resumed maintenance medication.         Discussed patient condition with RN, Patient and trauma surgery. Dr. Lopez

## 2019-02-28 NOTE — DISCHARGE PLANNING
note:  Received a call from daughter Damon.  She confirmed that they are agreeable for pt to go to [facility] and that they have signed the contract with [facility] ( ).Damon requested for a hospital bed for pt. Notified [facility] and asked for order. Choice made for whichever DME company is PAR with [facility] for DME. ( [employer])    Telephone order received from [facility] Venegas for [facility].

## 2019-02-28 NOTE — THERAPY
"Occupational Therapy Treatment completed with focus on ADLs and ADL transfers.  Functional Status:  Mod A for supine>sit; SPV for grooming while seated; max A for LB dressing; mod A for toileting; max A for sit>stand and functional transfers to chair and BSC  Plan of Care: Will benefit from Occupational Therapy 3 times per week  Discharge Recommendations:  Equipment Will Continue to Assess for Equipment Needs. Recommend inpatient transitional care services for continued occupational therapy services.     See \"Rehab Therapy-Acute\" Patient Summary Report for complete documentation.     Pt participated in OT tx session. Pt continues to be limited due to poor activity tolerance, decreased safety awareness and standing balance. Pt with improved verbalizations from eval and able to sustain conversation with tx. Pt's AROM for BUE appears adequate for functional tasks. Pt required max VC's for safety as impulsive with functional transfers. Pt continues to have heavy posterior lean with standing and poor weight shifting. Will continue to follow for acute OT services while in-house.   "

## 2019-02-28 NOTE — FACE TO FACE
Face to Face Supporting Documentation - Home Health    The encounter with this patient was in whole or in part the primary reason for home health admission.    Date of encounter:   Patient:                    MRN:                       YOB: 2019  Shirley Hearn  [id number]  11/25/1931     Home health to see patient for:  Skilled Nursing care for assessment, interventions & education, Medical social work consult, Home health aide, Physical Therapy evaluation and treatment and Occupational therapy evaluation and treatment    Skilled need for:  Recent Deterioration of Health Status Dementia worsening with resent GLF    Skilled nursing interventions to include:  Comment: Daily care and therapies    Homebound status evidenced by:  Need the aid of supportive devices such as crutches, canes, wheelchairs or walkers, Require the use of special transportation or Needs the assistance of another person in order to leave the home. Leaving home requires a considerable and taxing effort. There is a normal inability to leave the home.    Community Physician to provide follow up care: JAYASHREE Miranda     Optional Interventions? Yes, Details: skilled facilities      I certify the face to face encounter for this home health care referral meets the CMS requirements and the encounter/clinical assessment with the patient was, in whole, or in part, for the medical condition(s) listed above, which is the primary reason for home health care. Based on my clinical findings: the service(s) are medically necessary, support the need for home health care, and the homebound criteria are met.  I certify that this patient has had a face to face encounter by myself.  ISABELLE Lawrence. - NPI: [id number]

## 2019-02-28 NOTE — CARE PLAN
Problem: Safety  Goal: Will remain free from injury  Bed alarm on, call light within reach, hourly rounding in place.     Problem: Discharge Barriers/Planning  Goal: Patient's continuum of care needs will be met  SNF referrals out

## 2019-02-28 NOTE — CARE PLAN
Problem: Safety  Goal: Will remain free from falls  Outcome: PROGRESSING SLOWER THAN EXPECTED  Educated pt to call before getting up bed alarm on and call light within reach.

## 2019-02-28 NOTE — CARE PLAN
Problem: Infection  Goal: Will remain free from infection    Intervention: Assess signs and symptoms of infection  Pt afebrile, no chills, WBC 8.3. Good hand hygiene before and after pt contact.       Problem: Skin Integrity  Goal: Risk for impaired skin integrity will decrease    Intervention: Implement precautions to protect skin integrity in collaboration with the interdisciplinary team  Frequent toileting, barrier wipes and cream applied. Pt on waffle cushion.

## 2019-02-28 NOTE — PROGRESS NOTES
"Geriatric Progress Note:  2/28/2019    CC: GLF at home    S: Patient continues to improve her mental status standpoint.  Family is found to group home.  They are planning on discharging to the group home with home health rather than to skilled nursing facility.  Patient herself is now getting a little bit bothered by the c-collar, however continues to be pleasant.  She continues to have restless legs, and is not sleeping at night per trauma nurse practitioner report.    ROS: 14 point review of systems negative otherwise described as above    O:BP (!) 185/86 Comment: Notified RN  Pulse 86   Temp 36.8 °C (98.2 °F) (Temporal)   Resp 17   Ht 1.6 m (5' 3\")   Wt 59.2 kg (130 lb 8.2 oz)   SpO2 95%   Breastfeeding? No   BMI 23.12 kg/m²   General: No acute distress, laying in bed, alert oriented to self only  Eyes: No blepharitis noted  Neck, hard c-collar in place  Neuro: Able to move all 4 extremities to command  Abdomen: Soft nontender nondistended   Lungs: Clear to auscultation anteriorly, normal effort  Cardiovascular: Regular rate and rhythm 2+ radial pulses bilaterally  Delirium Screen: Negative patient shows some disorganized thinking but did not show any altered level of consciousness status and was able to days a week forwards and backwards with only prompting x1 per    Labs/Imaging/EKG: CBC, BMP,    Assessment: 87-year-old female past medical history dementia admitted after ground-level fall with resultant C2 vertebral fracture and subdural hematoma, course completed by delirium        Plan:  Delirium  Hypokalemia  -Resolved/resolving now with improving mentation  -Likely multifactorial the use of Ativan on admission did not likely help this patient's case and was likely the inciting cause.  -Treatment of hypokalemia as you are  -Pain treatment as below  -Mobility to maximum level patient was independently mobile with a walker prior to admission and we should aim for this  -For her issues with sitting " still would recommend a 24-hour supervision to help with distraction.  Patient's memory does not allow her to understand that she is in the hospital and need to stay in a bed., physical or chemical restraints will likely cause undue harm to this patient.  -Minimize polypharmacy.  Anticholinergic medications such as diphenhydramine can cause confusion and not preferred in any elderly patients.  For patients needing help with sleep pharmacological interventions are not often the answer and can cause undue harm.  If pharmacological intervention is needed consider low-dose trazodone at 25 mg.    Diarrhea  -Chronic per family.  -Nursing noted patient has minimal rectal tone suggesting chronic fecal leakage  -Improved with loperamide x1 yesterday  -Monitor for constipation     Dementia  -Unclear subtype, family reports multiple histories of TIAs however this MRI did not showed old strokes, no history of coronary artery disease  -Likely Alzheimer's dementia based on history  -On memantine 5 mg at home, continue to hold, underwent shared decision making with family on 2/27/2019  -TSH and B12 are normal  -Appreciate family's assistance, the presence will likely improve the patient's stay  -Mobilize patient, patient is already delirious, will need to minimize further issues with this, consider recommend additions below     Hypertension  -Use of hydrochlorthiazide likely potential reason for hypokalemia, consider pain as a reason for hypertension rather than true essential hypertension.  -Patient's blood pressure likely go back to normal within weeks given no use of BP meds PTA.  Counseled family on this and the may need to consider stopping her blood pressure medicines and potassium supplemntation     Recurrent falls  Osteoporosis  -Patient's family reports history consistent with seizure however it seems that they get this worked up.  She is on prophylactic Keppra due to her subdural hematoma, per neurosurgery.  -Encourage  mobility, she is on calcitonin as an outpatient unclear why she is on this rather than a bisphosphonate.  -Vitamin D level normal     Depression  - on home 10 mg escitalopram as patient is able to take oral medication     Disposition  -Family would like to go to skilled nursing facility, PT OT SLP to see patient in house     C2 vertebral fx  Subdural hematoma  -Management per neurosurgery, trauma  -Schedule Tylenol  -Consider more aggressive pain treatment based on response to Tylenol.  Currently appears to be stable  -hard C collar per neurosurgery X 6 weeks.   -On Keppra prophylaxis x7 total days       Disposition -patient likely to get discharged either tonight or tomorrow to her group home with home health.  Once home health is complete patient may transition her care plan to hospice. POLST filled out with palliative earlier this admission.      Interventions to be considered in all patients in order to minimize the risk of delirium.   -do not disturb patient (vitals or lab draws) between the hours of 10 PM and 6 AM.  -ideally the patient should not sleep during the day and we should avoid day time naps.   -up in chair for meals  -ambulate at least three times daily, as able  -watch for constipation  -timed voiding - ask patient is she would like to go to the bathroom q 2-3 hours, except during the do not disturb hours.   -remove all necessary lines (central lines, peripheral IVs, feeding tubes, abrams catheters)  -unless patient has shown harm to self or others I would recommend against use of restraints - either chemical or physical (antipsychotics)   -minimize polypharmacy, do not dose medication during sleep hours      Thank you for including us in the care of this patient. We will follow along tomorrow.     Alan Patel M.D.  Geriatric Physician   Can be reached at extension: [phone]  Monday - Friday 8-5      This note was partially complete completed using voice recognition software.  I did my best to  correct errors prior to submitting this note, but for any concerns please do not hesitate to contact me.

## 2019-02-28 NOTE — DISCHARGE PLANNING
Received Choice form at 1535.  Agency/Facility Name: [facility]   Referral sent per Choice form at 1540.

## 2019-02-28 NOTE — PROGRESS NOTES
Received report, assumed pt care. Pt a&o 1, VSS, assessment completed. Resting comfortably in bed with call light, bedside table in reach. No c/o at this time. Side rails up 2. Instructed to use call light when needing to get OOB verbalized understanding. RN will reinforce as pt forgets. Bed alarm on, bed in low position. Will continue to monitor.

## 2019-03-01 LAB
ANION GAP SERPL CALC-SCNC: 10 MMOL/L (ref 0–11.9)
BASOPHILS # BLD AUTO: 0.5 % (ref 0–1.8)
BASOPHILS # BLD: 0.04 K/UL (ref 0–0.12)
BUN SERPL-MCNC: 16 MG/DL (ref 8–22)
CALCIUM SERPL-MCNC: 9.9 MG/DL (ref 8.5–10.5)
CHLORIDE SERPL-SCNC: 106 MMOL/L (ref 96–112)
CO2 SERPL-SCNC: 25 MMOL/L (ref 20–33)
CREAT SERPL-MCNC: 1.07 MG/DL (ref 0.5–1.4)
EOSINOPHIL # BLD AUTO: 0.18 K/UL (ref 0–0.51)
EOSINOPHIL NFR BLD: 2.3 % (ref 0–6.9)
ERYTHROCYTE [DISTWIDTH] IN BLOOD BY AUTOMATED COUNT: 48.1 FL (ref 35.9–50)
GAMMA INTERFERON BACKGROUND BLD IA-ACNC: 0.03 IU/ML
GLUCOSE SERPL-MCNC: 104 MG/DL (ref 65–99)
HCT VFR BLD AUTO: 37.9 % (ref 37–47)
HGB BLD-MCNC: 12.9 G/DL (ref 12–16)
IMM GRANULOCYTES # BLD AUTO: 0.02 K/UL (ref 0–0.11)
IMM GRANULOCYTES NFR BLD AUTO: 0.3 % (ref 0–0.9)
LYMPHOCYTES # BLD AUTO: 2.42 K/UL (ref 1–4.8)
LYMPHOCYTES NFR BLD: 31.5 % (ref 22–41)
M TB IFN-G BLD-IMP: NEGATIVE
M TB IFN-G CD4+ BCKGRND COR BLD-ACNC: 0 IU/ML
MCH RBC QN AUTO: 31.9 PG (ref 27–33)
MCHC RBC AUTO-ENTMCNC: 34 G/DL (ref 33.6–35)
MCV RBC AUTO: 93.6 FL (ref 81.4–97.8)
MITOGEN IGNF BCKGRD COR BLD-ACNC: >10 IU/ML
MONOCYTES # BLD AUTO: 0.65 K/UL (ref 0–0.85)
MONOCYTES NFR BLD AUTO: 8.5 % (ref 0–13.4)
NEUTROPHILS # BLD AUTO: 4.38 K/UL (ref 2–7.15)
NEUTROPHILS NFR BLD: 56.9 % (ref 44–72)
NRBC # BLD AUTO: 0 K/UL
NRBC BLD-RTO: 0 /100 WBC
PLATELET # BLD AUTO: 120 K/UL (ref 164–446)
PMV BLD AUTO: 12.4 FL (ref 9–12.9)
POTASSIUM SERPL-SCNC: 3.4 MMOL/L (ref 3.6–5.5)
QFT TB2 - NIL TBQ2: 0 IU/ML
RBC # BLD AUTO: 4.05 M/UL (ref 4.2–5.4)
SODIUM SERPL-SCNC: 141 MMOL/L (ref 135–145)
WBC # BLD AUTO: 7.7 K/UL (ref 4.8–10.8)

## 2019-03-01 PROCEDURE — 99231 SBSQ HOSP IP/OBS SF/LOW 25: CPT | Performed by: INTERNAL MEDICINE

## 2019-03-01 PROCEDURE — 770001 HCHG ROOM/CARE - MED/SURG/GYN PRIV*

## 2019-03-01 PROCEDURE — 700102 HCHG RX REV CODE 250 W/ 637 OVERRIDE(OP): Performed by: NURSE PRACTITIONER

## 2019-03-01 PROCEDURE — A9270 NON-COVERED ITEM OR SERVICE: HCPCS | Performed by: NURSE PRACTITIONER

## 2019-03-01 PROCEDURE — 36415 COLL VENOUS BLD VENIPUNCTURE: CPT

## 2019-03-01 PROCEDURE — 85025 COMPLETE CBC W/AUTO DIFF WBC: CPT

## 2019-03-01 PROCEDURE — 92526 ORAL FUNCTION THERAPY: CPT

## 2019-03-01 PROCEDURE — 80048 BASIC METABOLIC PNL TOTAL CA: CPT

## 2019-03-01 PROCEDURE — A9270 NON-COVERED ITEM OR SERVICE: HCPCS | Performed by: INTERNAL MEDICINE

## 2019-03-01 PROCEDURE — 700102 HCHG RX REV CODE 250 W/ 637 OVERRIDE(OP): Performed by: INTERNAL MEDICINE

## 2019-03-01 RX ORDER — ACETAMINOPHEN 650 MG/1
650 SUPPOSITORY RECTAL EVERY 6 HOURS PRN
Refills: 0 | COMMUNITY
Start: 2019-03-01

## 2019-03-01 RX ADMIN — HYDROCHLOROTHIAZIDE 12.5 MG: 25 TABLET ORAL at 05:00

## 2019-03-01 RX ADMIN — LEVETIRACETAM 500 MG: 250 TABLET, FILM COATED ORAL at 05:00

## 2019-03-01 RX ADMIN — LOSARTAN POTASSIUM 100 MG: 50 TABLET ORAL at 05:00

## 2019-03-01 RX ADMIN — ACETAMINOPHEN 1000 MG: 500 TABLET ORAL at 13:08

## 2019-03-01 RX ADMIN — ACETAMINOPHEN 1000 MG: 500 TABLET ORAL at 17:03

## 2019-03-01 RX ADMIN — ACETAMINOPHEN 1000 MG: 500 TABLET ORAL at 05:00

## 2019-03-01 RX ADMIN — TRAZODONE HYDROCHLORIDE 25 MG: 50 TABLET ORAL at 20:04

## 2019-03-01 RX ADMIN — ESCITALOPRAM OXALATE 10 MG: 10 TABLET ORAL at 05:00

## 2019-03-01 RX ADMIN — POTASSIUM CHLORIDE 20 MEQ: 1500 TABLET, EXTENDED RELEASE ORAL at 05:00

## 2019-03-01 ASSESSMENT — PATIENT HEALTH QUESTIONNAIRE - PHQ9
SUM OF ALL RESPONSES TO PHQ9 QUESTIONS 1 AND 2: 0
2. FEELING DOWN, DEPRESSED, IRRITABLE, OR HOPELESS: NOT AT ALL
1. LITTLE INTEREST OR PLEASURE IN DOING THINGS: NOT AT ALL

## 2019-03-01 ASSESSMENT — ENCOUNTER SYMPTOMS: FEVER: 0

## 2019-03-01 NOTE — PROGRESS NOTES
Trauma / Surgical Daily Progress Note    Date of Service  3/1/2019    Chief Complaint  87 y.o. female admitted 2/22/2019 with Subdural hematoma (HCC)    Interval Events  Transfer to Group home today.   Limited tertiary survey completed, with no further findings  Pt remains confused.   Home health with hospital bed ordered.       Review of Systems  Review of Systems   Unable to perform ROS: Mental acuity   Constitutional: Negative for fever.   Respiratory:        RA   Gastrointestinal:        Last bowel movement 2/27   Genitourinary:        Voiding          Vital Signs  Temp:  [36.2 °C (97.2 °F)-36.9 °C (98.5 °F)] 36.2 °C (97.2 °F)  Pulse:  [82-86] 86  Resp:  [16-17] 16  BP: (125-185)/(74-86) 125/80  SpO2:  [93 %-96 %] 96 %    Physical Exam  Physical Exam   Constitutional: She appears well-developed and well-nourished. She is easily aroused. No distress. Cervical collar in place.   Elderly   HENT:   Head: Normocephalic.   Eyes: Conjunctivae are normal.   Neck: Neck supple.   C-collar   Cardiovascular: Normal rate.    Pulmonary/Chest: Effort normal. No respiratory distress.   Abdominal: Soft. Bowel sounds are normal. She exhibits no distension.   Musculoskeletal: She exhibits no edema or tenderness.   Moves all extremities   Neurological: She is easily aroused. GCS eye subscore is 3. GCS verbal subscore is 3. GCS motor subscore is 5.   A & O to self only   Skin: Skin is warm and dry.   Nursing note and vitals reviewed.      Laboratory  Recent Results (from the past 24 hour(s))   Basic Metabolic Panel    Collection Time: 03/01/19  2:21 AM   Result Value Ref Range    Sodium 141 135 - 145 mmol/L    Potassium 3.4 (L) 3.6 - 5.5 mmol/L    Chloride 106 96 - 112 mmol/L    Co2 25 20 - 33 mmol/L    Glucose 104 (H) 65 - 99 mg/dL    Bun 16 8 - 22 mg/dL    Creatinine 1.07 0.50 - 1.40 mg/dL    Calcium 9.9 8.5 - 10.5 mg/dL    Anion Gap 10.0 0.0 - 11.9   CBC WITH DIFFERENTIAL    Collection Time: 03/01/19  2:21 AM   Result Value Ref  Range    WBC 7.7 4.8 - 10.8 K/uL    RBC 4.05 (L) 4.20 - 5.40 M/uL    Hemoglobin 12.9 12.0 - 16.0 g/dL    Hematocrit 37.9 37.0 - 47.0 %    MCV 93.6 81.4 - 97.8 fL    MCH 31.9 27.0 - 33.0 pg    MCHC 34.0 33.6 - 35.0 g/dL    RDW 48.1 35.9 - 50.0 fL    Platelet Count 120 (L) 164 - 446 K/uL    MPV 12.4 9.0 - 12.9 fL    Neutrophils-Polys 56.90 44.00 - 72.00 %    Lymphocytes 31.50 22.00 - 41.00 %    Monocytes 8.50 0.00 - 13.40 %    Eosinophils 2.30 0.00 - 6.90 %    Basophils 0.50 0.00 - 1.80 %    Immature Granulocytes 0.30 0.00 - 0.90 %    Nucleated RBC 0.00 /100 WBC    Neutrophils (Absolute) 4.38 2.00 - 7.15 K/uL    Lymphs (Absolute) 2.42 1.00 - 4.80 K/uL    Monos (Absolute) 0.65 0.00 - 0.85 K/uL    Eos (Absolute) 0.18 0.00 - 0.51 K/uL    Baso (Absolute) 0.04 0.00 - 0.12 K/uL    Immature Granulocytes (abs) 0.02 0.00 - 0.11 K/uL    NRBC (Absolute) 0.00 K/uL   ESTIMATED GFR    Collection Time: 03/01/19  2:21 AM   Result Value Ref Range    GFR If  59 (A) >60 mL/min/1.73 m 2    GFR If Non  48 (A) >60 mL/min/1.73 m 2       Fluids  No intake or output data in the 24 hours ending 03/01/19 0853    Core Measures & Quality Metrics  Labs reviewed, Medications reviewed and Radiology images reviewed  Grande Catheter: Continue until more alert.      DVT Prophylaxis: Contraindicated - High bleeding risk  DVT prophylaxis - mechanical: SCDs  Ulcer prophylaxis: Yes    Assessed for rehab: Patient unable to tolerate rehabilitation therapeutic regimen    Total Score: 11    ETOH Screening     Reason for no ETOH Intervention: Traumatic Brain Injury (Mental acuity)  ETOH Screening     Intervention complete date: 2/23/2019        Assessment/Plan  Hypokalemia   Assessment & Plan    Replete and trend  Kdur q day ordered     Palliative care encounter- (present on admission)   Assessment & Plan    2/23 Palliative care consult for advance care planning and POLST completion.     Encounter for geriatric assessment-  (present on admission)   Assessment & Plan    2/23 Geriatric consult   Recommendations appreciated and implemented     Traumatic brain injury (HCC)- (present on admission)   Assessment & Plan    Acute subdural vs epidural hemorrhage in the left parietal region with the greatest transverse diameter of 1.2cm causing mild local mass effect  Repeat interval CT imaging unchanged.  Non-operative management.   Post traumatic pharmacologic seizure prophylaxis for 1 week (2/28).  Andrea Alejo MD. Neurosurgery.     Fracture of second cervical vertebra (HCC)- (present on admission)   Assessment & Plan    Nondisplaced oblique fracture mid dens of C2.  Non-operative management.   Aspen collar on at all times for 6 weeks  Andrea Alejo MD. Neurosurgery.     Discharge planning issues   Assessment & Plan    2/25 Skilled referral placed.  2/26 Pending accepting facility      Family history of Alzheimer's disease- (present on admission)   Assessment & Plan    Premorbid.  Lives with daughter.  Ambulates with walker, has fallen a couple of times due to unsteady gait, able to complete all self care activities.  Normalize sleep/wake cycles.     Contraindication to deep vein thrombosis (DVT) prophylaxis- (present on admission)   Assessment & Plan    Systemic anticoagulation contraindicated secondary to elevated bleeding risk.  RAP score 11.  2/23 Surveillance venous duplex scanning ordered.  2/24 Trauma screening bilateral lower extremity venous duplex negative for above knee DVT.     Platelet dysfunction due to drugs- (present on admission)   Assessment & Plan    History of ASA and Plavix for TIA.  Admission TEG with platelet mapping demonstrated prolonged AA.  2/22 Transfused 1 unit platelets.  Repeat TEG with 88.8% AA inhibition. Repeat imaging unchanged so no further platelet transfusion.  Hold ASA and Plavix for 6 weeks per neurosurgery.     Scalp laceration- (present on admission)   Assessment & Plan    Right scalp  laceration.  Stapled repair completed in ED.  Plan for staple removal at 7 days (3/1).     Trauma- (present on admission)   Assessment & Plan    Ground level fall, unknown LOC.  Trauma Yellow Transfer Activation.  Smiley Collins MD. Trauma Surgery.     Essential hypertension- (present on admission)   Assessment & Plan    Chronic condition treated with Hyzaar.  2/23 Resumed maintenance medication.         Discussed patient condition with RN, Patient and trauma surgery. Dr. Lopez

## 2019-03-01 NOTE — PROGRESS NOTES
· 2 RN skin check compete with SKYE Fernando:   · Generalized bruising, Lac to R side of head-staples JONA with old drainage. Redness to perineal area due to incontinence.  · Devices in place: C-Collar  · Skin assessed under devices: Mepilex in place under C-Collar  · Following interventions in place: Pt turns self frequenty, Waffle mattress overlay, Pillows in place for repositioning, Barrier wipes and barrier cream applied, monitored frequently for incontinence.

## 2019-03-01 NOTE — DISCHARGE PLANNING
Agency/Facility Name: [facility]  Spoke To: Perla   Outcome: Referral currently being handed off for processing. SKYE Jay notified.

## 2019-03-01 NOTE — PROGRESS NOTES
Bedside report recieved, assumed care. Pt is A&Ox1. No complaints at this time.   Plan of care discussed. All needs met at this time.   Bed alarm on and bed frame alarm on. Bed locked and in bed in low position, call light and belongings within reach, upper rails up.

## 2019-03-01 NOTE — PROGRESS NOTES
Bedside report received from night shift RN, care assumed. Pt assessed, A&Ox1. Pt denies any pain/needs at this time.Bed locked in lowest position, bed alarm on. Call light and personal belongings within reach.    1700: Pt up in chair, family stated that pt wants to stand up. CNA helped pt to stand up with FWW. Pt back to chair and c/o nausea. When this RN stepped into the room, pt stopped responding. Pt was drooling, no breathing observed at that time. This RN, Panda CHEUNG, and Lena YU brought pt back to bed. Pt opened her eyes and answering the questions again. VS are stable. Pt got up to commode with x2 assist later, no c/o dizziness, no s/s of distress at this time.

## 2019-03-01 NOTE — PROGRESS NOTES
"Geriatric Progress Note:  3/1/2019  CC: GLF at home    S: Patient continues to improve her mental status standpoint.  No acute changes, awaiting group home placement and the logistics associated with that.    ROS: 14 point review of systems negative otherwise described as above    O:/87   Pulse 76   Temp 37.2 °C (98.9 °F) (Temporal)   Resp 18   Ht 1.6 m (5' 3\")   Wt 59.2 kg (130 lb 8.2 oz)   SpO2 96%   Breastfeeding? No   BMI 23.12 kg/m²   General: No acute distress, laying in bed, alert oriented to self only  Eyes: No blepharitis noted  Neck, hard c-collar in place  Neuro: Able to move all 4 extremities to command  Abdomen: Soft nontender nondistended   Lungs: Clear to auscultation anteriorly, normal effort  Cardiovascular: Regular rate and rhythm 2+ radial pulses bilaterally  Delirium Screen: Patient was unable to say the days of the week backwards today, she was able to sit him forward    Labs/Imaging/EKG: CBC, BMP,    Assessment: 87-year-old female past medical history dementia admitted after ground-level fall with resultant C2 vertebral fracture and subdural hematoma, course completed by delirium        Plan:  Delirium  Hypokalemia  -Resolved/resolving now with improving mentation  -Likely multifactorial the use of Ativan on admission did not likely help this patient's case and was likely the inciting cause.  -Treatment of hypokalemia as you are  -Pain treatment as below  -Mobility to maximum level patient was independently mobile with a walker prior to admission and we should aim for this  -For her issues with sitting still would recommend a 24-hour supervision to help with distraction.  Patient's memory does not allow her to understand that she is in the hospital and need to stay in a bed., physical or chemical restraints will likely cause undue harm to this patient.  -Minimize polypharmacy.  Anticholinergic medications such as diphenhydramine can cause confusion and not preferred in any elderly " patients.  For patients needing help with sleep pharmacological interventions are not often the answer and can cause undue harm.  If pharmacological intervention is needed consider low-dose trazodone at 25 mg.    Diarrhea  -Chronic per family.  -Nursing noted patient has minimal rectal tone suggesting chronic fecal leakage  -Improved with loperamide x1 yesterday  -Monitor for constipation     Dementia  -Unclear subtype, family reports multiple histories of TIAs however this MRI did not showed old strokes, no history of coronary artery disease  -Likely Alzheimer's dementia based on history  -On memantine 5 mg at home, continue to hold, underwent shared decision making with family on 2/27/2019  -TSH and B12 are normal  -Appreciate family's assistance, the presence will likely improve the patient's stay  -Mobilize patient, patient is already delirious, will need to minimize further issues with this, consider recommend additions below     Hypertension  -Use of hydrochlorthiazide likely potential reason for hypokalemia, consider pain as a reason for hypertension rather than true essential hypertension.  -Patient's blood pressure likely go back to normal within weeks given no use of BP meds PTA.  Counseled family on this and the may need to consider stopping her blood pressure medicines and potassium supplemntation     Recurrent falls  Osteoporosis  -Patient's family reports history consistent with seizure however it seems that they get this worked up.  She is on prophylactic Keppra due to her subdural hematoma, per neurosurgery.  -Encourage mobility, she is on calcitonin as an outpatient unclear why she is on this rather than a bisphosphonate.  -Vitamin D level normal     Depression  - on home 10 mg escitalopram as patient is able to take oral medication     Disposition  -Family would like to go to skilled nursing facility, PT OT SLP to see patient in house     C2 vertebral fx  Subdural hematoma  -Management per  neurosurgery, trauma  -Schedule Tylenol  -Consider more aggressive pain treatment based on response to Tylenol.  Currently appears to be stable  -hard C collar per neurosurgery X 6 weeks.   -On Keppra prophylaxis x7 total days       Disposition -patient likely to get discharged once all of the logistics associated with moving to group home were completed such as the quant gold and obtaining a hospital bed.  Home health is being arranged, with home health is complete Family may transition to hospice care, see palliative consult    Interventions to be considered in all patients in order to minimize the risk of delirium.   -do not disturb patient (vitals or lab draws) between the hours of 10 PM and 6 AM.  -ideally the patient should not sleep during the day and we should avoid day time naps.   -up in chair for meals  -ambulate at least three times daily, as able  -watch for constipation  -timed voiding - ask patient is she would like to go to the bathroom q 2-3 hours, except during the do not disturb hours.   -remove all necessary lines (central lines, peripheral IVs, feeding tubes, abrams catheters)  -unless patient has shown harm to self or others I would recommend against use of restraints - either chemical or physical (antipsychotics)   -minimize polypharmacy, do not dose medication during sleep hours      We will continue to follow that patient along with you on Monday if the patient is still admitted.   Unfortunately we do not have weekend or night time coverage.    Alan Patel M.D.  Geriatric Physician   Can be reached at extension: [phone]  Monday - Friday 8-5      This note was partially complete completed using voice recognition software.  I did my best to correct errors prior to submitting this note, but for any concerns please do not hesitate to contact me.

## 2019-03-01 NOTE — CARE PLAN
Problem: Safety  Goal: Will remain free from injury  Outcome: PROGRESSING AS EXPECTED  Fall risk assessed using Lockwood-Vimal Scale. High fall risk. Bed alarm and bed frame alarm on. Pt educated to call before trying to get out of bed.     Problem: Urinary Elimination:  Goal: Ability to reestablish a normal urinary elimination pattern will improve  Outcome: PROGRESSING AS EXPECTED  Pt is incontinent. Barrier cream in use.

## 2019-03-01 NOTE — THERAPY
"Speech Language Therapy dysphagia treatment completed.   Functional Status:  Patient was seen for dysphagia tx this date. Patient was upright in bed collar was donned. Patient with generalized weakness and sleepiness noted with maximum stimulation to sustain ANAHI. Patient consumed PO trials of NTL with no clinical s/sx of aspiration noted. She stated some painful swallowing but as trails progressed patient stated the pain in her throat subsided. At this time recommend continue  NTFL diet with assist and assist for set up. SLP to continue to follow.   Recommendations: Continue a NTFL diet 1:1 max A.    Plan of Care: Will benefit from Speech Therapy 3 times per week  Post-Acute Therapy: Discharge to a transitional care facility for continued skilled therapy services.    See \"Rehab Therapy-Acute\" Patient Summary Report for complete documentation.     "

## 2019-03-01 NOTE — CARE PLAN
Problem: Safety  Goal: Will remain free from falls  Outcome: PROGRESSING AS EXPECTED  Bed locked in lowest position, bed alarm on. Call light and personal belongings within reach. Hourly rounding.    Problem: Infection  Goal: Will remain free from infection  Outcome: PROGRESSING AS EXPECTED  Pt assessed, medicated per MAR. Proper hand hygiene performed, infection prevention education provided.

## 2019-03-01 NOTE — DISCHARGE PLANNING
Anticipated Discharge Disposition:    accepted but will not accept until the Quant Gold is resulted.   [facility] accepted but no auth from insurance    Action:   Talked to   [facility] who said that they are not able to accept the pt if Quant Gold is not resulted.     Talked to Fernanda at [facility] who said that they will accept pt but they will have to get auth     Hospital bed has not been approved and delivered.   [facility] accepted     Barriers to Discharge:   Quant Gold result vs insurance auth for SNF.( whichever comes first and whatever family will be agreeable with)    Plan:   Follow up Quant Gold Result  Follow up with SNF  Discuss with family.    Addendum:  Talked to family about possible dc on Monday wheneever Quant Gold Result is obtained.

## 2019-03-02 ENCOUNTER — APPOINTMENT (OUTPATIENT)
Dept: RADIOLOGY | Facility: MEDICAL CENTER | Age: 84
DRG: 083 | End: 2019-03-02
Attending: NURSE PRACTITIONER
Payer: MEDICARE

## 2019-03-02 LAB
ANION GAP SERPL CALC-SCNC: 8 MMOL/L (ref 0–11.9)
BASOPHILS # BLD AUTO: 0.7 % (ref 0–1.8)
BASOPHILS # BLD: 0.05 K/UL (ref 0–0.12)
BUN SERPL-MCNC: 20 MG/DL (ref 8–22)
CALCIUM SERPL-MCNC: 9.4 MG/DL (ref 8.5–10.5)
CHLORIDE SERPL-SCNC: 106 MMOL/L (ref 96–112)
CO2 SERPL-SCNC: 26 MMOL/L (ref 20–33)
CREAT SERPL-MCNC: 1.11 MG/DL (ref 0.5–1.4)
EOSINOPHIL # BLD AUTO: 0.26 K/UL (ref 0–0.51)
EOSINOPHIL NFR BLD: 3.5 % (ref 0–6.9)
ERYTHROCYTE [DISTWIDTH] IN BLOOD BY AUTOMATED COUNT: 49.1 FL (ref 35.9–50)
GLUCOSE SERPL-MCNC: 124 MG/DL (ref 65–99)
HCT VFR BLD AUTO: 38.7 % (ref 37–47)
HGB BLD-MCNC: 12.5 G/DL (ref 12–16)
IMM GRANULOCYTES # BLD AUTO: 0.01 K/UL (ref 0–0.11)
IMM GRANULOCYTES NFR BLD AUTO: 0.1 % (ref 0–0.9)
LYMPHOCYTES # BLD AUTO: 2.43 K/UL (ref 1–4.8)
LYMPHOCYTES NFR BLD: 32.7 % (ref 22–41)
MCH RBC QN AUTO: 30.7 PG (ref 27–33)
MCHC RBC AUTO-ENTMCNC: 32.3 G/DL (ref 33.6–35)
MCV RBC AUTO: 95.1 FL (ref 81.4–97.8)
MONOCYTES # BLD AUTO: 0.63 K/UL (ref 0–0.85)
MONOCYTES NFR BLD AUTO: 8.5 % (ref 0–13.4)
NEUTROPHILS # BLD AUTO: 4.05 K/UL (ref 2–7.15)
NEUTROPHILS NFR BLD: 54.5 % (ref 44–72)
NRBC # BLD AUTO: 0 K/UL
NRBC BLD-RTO: 0 /100 WBC
PLATELET # BLD AUTO: 163 K/UL (ref 164–446)
PMV BLD AUTO: 11.9 FL (ref 9–12.9)
POTASSIUM SERPL-SCNC: 3.6 MMOL/L (ref 3.6–5.5)
RBC # BLD AUTO: 4.07 M/UL (ref 4.2–5.4)
SODIUM SERPL-SCNC: 140 MMOL/L (ref 135–145)
WBC # BLD AUTO: 7.4 K/UL (ref 4.8–10.8)

## 2019-03-02 PROCEDURE — 93970 EXTREMITY STUDY: CPT

## 2019-03-02 PROCEDURE — 93970 EXTREMITY STUDY: CPT | Mod: 26 | Performed by: SURGERY

## 2019-03-02 PROCEDURE — 80048 BASIC METABOLIC PNL TOTAL CA: CPT

## 2019-03-02 PROCEDURE — 770001 HCHG ROOM/CARE - MED/SURG/GYN PRIV*

## 2019-03-02 PROCEDURE — 36415 COLL VENOUS BLD VENIPUNCTURE: CPT

## 2019-03-02 PROCEDURE — 700102 HCHG RX REV CODE 250 W/ 637 OVERRIDE(OP): Performed by: NURSE PRACTITIONER

## 2019-03-02 PROCEDURE — A9270 NON-COVERED ITEM OR SERVICE: HCPCS | Performed by: NURSE PRACTITIONER

## 2019-03-02 PROCEDURE — 70450 CT HEAD/BRAIN W/O DYE: CPT

## 2019-03-02 PROCEDURE — A9270 NON-COVERED ITEM OR SERVICE: HCPCS | Performed by: INTERNAL MEDICINE

## 2019-03-02 PROCEDURE — 700102 HCHG RX REV CODE 250 W/ 637 OVERRIDE(OP): Performed by: INTERNAL MEDICINE

## 2019-03-02 PROCEDURE — 85025 COMPLETE CBC W/AUTO DIFF WBC: CPT

## 2019-03-02 RX ADMIN — TRAZODONE HYDROCHLORIDE 25 MG: 50 TABLET ORAL at 20:38

## 2019-03-02 RX ADMIN — LOSARTAN POTASSIUM 100 MG: 50 TABLET ORAL at 04:59

## 2019-03-02 RX ADMIN — ACETAMINOPHEN 1000 MG: 500 TABLET ORAL at 18:26

## 2019-03-02 RX ADMIN — ACETAMINOPHEN 1000 MG: 500 TABLET ORAL at 05:00

## 2019-03-02 RX ADMIN — HYDROCHLOROTHIAZIDE 12.5 MG: 25 TABLET ORAL at 05:01

## 2019-03-02 RX ADMIN — POTASSIUM CHLORIDE 20 MEQ: 1500 TABLET, EXTENDED RELEASE ORAL at 04:59

## 2019-03-02 RX ADMIN — ESCITALOPRAM OXALATE 10 MG: 10 TABLET ORAL at 05:02

## 2019-03-02 ASSESSMENT — PATIENT HEALTH QUESTIONNAIRE - PHQ9
2. FEELING DOWN, DEPRESSED, IRRITABLE, OR HOPELESS: NOT AT ALL
SUM OF ALL RESPONSES TO PHQ9 QUESTIONS 1 AND 2: 0
1. LITTLE INTEREST OR PLEASURE IN DOING THINGS: NOT AT ALL

## 2019-03-02 NOTE — DISCHARGE SUMMARY
"Blood pressure (!) 179/84, pulse 87, temperature 36.1 °C (96.9 °F), temperature source Temporal, resp. rate 16, height 1.6 m (5' 3\"), weight 59.2 kg (130 lb 8.2 oz), SpO2 92 %, not currently breastfeeding.  Quantiferon Gold Plus TB negative   Cleared for transfer to group home    No new events, since discharge summary addendum.    Medically cleared for discharge to group home      "

## 2019-03-02 NOTE — DISCHARGE PLANNING
Agency/Facility Name: [facility]   Spoke To: Lopez: Technician on Call  Outcome: He said he has no record of the bed being approved. The last he heard, it was still pending approval. There is no one in the office that can help or do anything further until Monday the 4th.     RN/CM Misty notified.

## 2019-03-02 NOTE — DISCHARGE PLANNING
Anticipated Discharge Disposition:      Action:   [facility] called CM back stating that [facility] has not delivered the hospital bed yet so they are not agreeable to accept pt yet. CM asked [facility] to follow up with [facility] for the hospital bed delivery    Updates given to daughter about discharge on Monday due to bed not being delivered.     Barriers to Discharge:   Pending  agreeable to accept pt.    Plan: follow up with [facility]

## 2019-03-02 NOTE — DISCHARGE PLANNING
"Agency/Facility Name: [facility]  Spoke To: Ale  Outcome: Bed has been \"manifested\" which means that it is on a delivery truck. She did not know where or why it was not delivered because they normally do not deliver beds \"after hours\" which includes Saturday. The  on call will be contacting the [facility].     RN/CM Misty notified.       "

## 2019-03-02 NOTE — PROGRESS NOTES
"Bedside report received from night shift RN, care assumed. Pt assessed,sleeping at this time. Breathing unlabored, no signs of discomfort at this time.Bed locked in lowest position, bed alarm on. Call light and personal belongings within reach.    1610: Pt is more confused than usual; not following commands, can not answer any questions, answers \"I will\" for every question. Speech slurred. Pt is restless. Pt combative during ultrasound exam, this RN and CNA had to assist ultrasound tech to keep pt in bed during exam. Called [facility] MARTINI. Stat CT head w/o order placed per APRN at this time.  "

## 2019-03-02 NOTE — CARE PLAN
Problem: Communication  Goal: The ability to communicate needs accurately and effectively will improve  Outcome: PROGRESSING AS EXPECTED      Problem: Psychosocial Needs:  Goal: Level of anxiety will decrease  Outcome: PROGRESSING AS EXPECTED      Problem: Skin Integrity  Goal: Risk for impaired skin integrity will decrease  Outcome: PROGRESSING AS EXPECTED

## 2019-03-02 NOTE — DISCHARGE PLANNING
Agency/Facility Name: [facility]   Spoke To: Krystal   Outcome: GH address and owners name/phone number given to [facility] to schedule delivery for patient's medical bed to group home. RN FELICIA Jay notified.

## 2019-03-02 NOTE — DISCHARGE PLANNING
Anticipated Discharge Disposition:       Action:   Quant Gold Negative  [facility] at [facility] waiting for bed delivery  Per [facility], [facility] is processing hospital bed order.   Notified [facility]IN[facility] that there is paper work to be completed for .  Faxed discharge instructions and  documents including the Quant Gold results to [facility] @ [phone].   Talked to Paulina at [facility] she will call  back to confirm if they have received the hospital bed at their facilty.     Barriers to Discharge:   Pending  agreeable to accept pt tomorrow if logistics are completed.     Plan:   Follow up with Matteo and VINI Childs.

## 2019-03-03 LAB
ANION GAP SERPL CALC-SCNC: 10 MMOL/L (ref 0–11.9)
BASOPHILS # BLD AUTO: 0.2 % (ref 0–1.8)
BASOPHILS # BLD: 0.02 K/UL (ref 0–0.12)
BUN SERPL-MCNC: 24 MG/DL (ref 8–22)
CALCIUM SERPL-MCNC: 9.5 MG/DL (ref 8.5–10.5)
CHLORIDE SERPL-SCNC: 106 MMOL/L (ref 96–112)
CO2 SERPL-SCNC: 26 MMOL/L (ref 20–33)
CREAT SERPL-MCNC: 1.1 MG/DL (ref 0.5–1.4)
EOSINOPHIL # BLD AUTO: 0.2 K/UL (ref 0–0.51)
EOSINOPHIL NFR BLD: 2.4 % (ref 0–6.9)
ERYTHROCYTE [DISTWIDTH] IN BLOOD BY AUTOMATED COUNT: 48.8 FL (ref 35.9–50)
GLUCOSE SERPL-MCNC: 125 MG/DL (ref 65–99)
HCT VFR BLD AUTO: 38.1 % (ref 37–47)
HGB BLD-MCNC: 12.3 G/DL (ref 12–16)
IMM GRANULOCYTES # BLD AUTO: 0.02 K/UL (ref 0–0.11)
IMM GRANULOCYTES NFR BLD AUTO: 0.2 % (ref 0–0.9)
LYMPHOCYTES # BLD AUTO: 2.73 K/UL (ref 1–4.8)
LYMPHOCYTES NFR BLD: 33.2 % (ref 22–41)
MCH RBC QN AUTO: 30.6 PG (ref 27–33)
MCHC RBC AUTO-ENTMCNC: 32.3 G/DL (ref 33.6–35)
MCV RBC AUTO: 94.8 FL (ref 81.4–97.8)
MONOCYTES # BLD AUTO: 0.8 K/UL (ref 0–0.85)
MONOCYTES NFR BLD AUTO: 9.7 % (ref 0–13.4)
NEUTROPHILS # BLD AUTO: 4.46 K/UL (ref 2–7.15)
NEUTROPHILS NFR BLD: 54.3 % (ref 44–72)
NRBC # BLD AUTO: 0 K/UL
NRBC BLD-RTO: 0 /100 WBC
PLATELET # BLD AUTO: 179 K/UL (ref 164–446)
PMV BLD AUTO: 12.1 FL (ref 9–12.9)
POTASSIUM SERPL-SCNC: 3.4 MMOL/L (ref 3.6–5.5)
RBC # BLD AUTO: 4.02 M/UL (ref 4.2–5.4)
SODIUM SERPL-SCNC: 142 MMOL/L (ref 135–145)
WBC # BLD AUTO: 8.2 K/UL (ref 4.8–10.8)

## 2019-03-03 PROCEDURE — 36415 COLL VENOUS BLD VENIPUNCTURE: CPT

## 2019-03-03 PROCEDURE — 770001 HCHG ROOM/CARE - MED/SURG/GYN PRIV*

## 2019-03-03 PROCEDURE — 700102 HCHG RX REV CODE 250 W/ 637 OVERRIDE(OP): Performed by: NURSE PRACTITIONER

## 2019-03-03 PROCEDURE — A9270 NON-COVERED ITEM OR SERVICE: HCPCS | Performed by: NURSE PRACTITIONER

## 2019-03-03 PROCEDURE — 80048 BASIC METABOLIC PNL TOTAL CA: CPT

## 2019-03-03 PROCEDURE — 85025 COMPLETE CBC W/AUTO DIFF WBC: CPT

## 2019-03-03 RX ADMIN — ACETAMINOPHEN 1000 MG: 500 TABLET ORAL at 11:30

## 2019-03-03 RX ADMIN — TRAZODONE HYDROCHLORIDE 25 MG: 50 TABLET ORAL at 21:27

## 2019-03-03 RX ADMIN — ACETAMINOPHEN 1000 MG: 500 TABLET ORAL at 17:55

## 2019-03-03 RX ADMIN — ACETAMINOPHEN 650 MG: 650 SUPPOSITORY RECTAL at 21:31

## 2019-03-03 ASSESSMENT — ENCOUNTER SYMPTOMS: FEVER: 0

## 2019-03-03 ASSESSMENT — PATIENT HEALTH QUESTIONNAIRE - PHQ9
SUM OF ALL RESPONSES TO PHQ9 QUESTIONS 1 AND 2: 0
1. LITTLE INTEREST OR PLEASURE IN DOING THINGS: NOT AT ALL

## 2019-03-03 NOTE — PROGRESS NOTES
RN MOBILITY NOTE     Surgery patient?:no  Date of surgery: n/a  Ambulated 50 ft on day of surgery? (N/A if today is not date of surgery): n/a  Number of times ambulated 50 feet or greater today: 0  Patient has been up to chair, edge of bed or HOB 90 degrees for all meals?: yes  Goal met? (goal is ambulating at least 50 feet 2 times on day shift, one time on night shift): no  If patient did not meet mobility goal, why?:pt unable to ambulate

## 2019-03-03 NOTE — PROGRESS NOTES
RN MOBILITY NOTE     Surgery patient?: no  Date of surgery: n/a  Ambulated 50 ft on day of surgery? (N/A if today is not date of surgery): n/a  Number of times ambulated 50 feet or greater today: no  Patient has been up to chair, edge of bed or HOB 90 degrees for all meals?: yes  Goal met? (goal is ambulating at least 50 feet 2 times on day shift, one time on night shift): no  If patient did not meet mobility goal, why?: pt unable to ambulate

## 2019-03-03 NOTE — PROGRESS NOTES
Trauma / Surgical Daily Progress Note    Date of Service  3/3/2019    Chief Complaint  87 y.o. female admitted 2/22/2019 with Subdural hematoma (HCC)  GLF    Interval Events  Pt remains medically cleared for post acute services  Awaiting hospital bed to be delivered, in process.  Repeat head CT decrease in size of hematoma  Nursing reported change in Pts mental status  Pts code status DNR.      Review of Systems  Review of Systems   Unable to perform ROS: Mental acuity   Constitutional: Negative for fever.   Respiratory:        RA   Gastrointestinal:        Last bowel movement 2/27   Genitourinary:        Voiding          Vital Signs  Temp:  [36.1 °C (97 °F)-37.3 °C (99.2 °F)] 36.1 °C (97 °F)  Pulse:  [] 90  Resp:  [18] 18  BP: (106-155)/(54-83) 106/54  SpO2:  [93 %-96 %] 93 %    Physical Exam  Physical Exam   Constitutional: She appears well-developed and well-nourished. She is easily aroused. No distress. Cervical collar in place.   Elderly   HENT:   Head: Normocephalic.   Eyes: Pupils are equal, round, and reactive to light. Conjunctivae are normal.   Neck:   C-collar   Cardiovascular: Normal rate.    Pulmonary/Chest: Effort normal. No respiratory distress.   Abdominal: Soft. Bowel sounds are normal. She exhibits no distension.   Musculoskeletal:   Moves all extremities   Neurological: She is easily aroused. GCS eye subscore is 3. GCS verbal subscore is 3. GCS motor subscore is 5.   A & O to self only   Skin: Skin is warm and dry.   Nursing note and vitals reviewed.      Laboratory  Recent Results (from the past 24 hour(s))   Basic Metabolic Panel    Collection Time: 03/03/19  2:39 AM   Result Value Ref Range    Sodium 142 135 - 145 mmol/L    Potassium 3.4 (L) 3.6 - 5.5 mmol/L    Chloride 106 96 - 112 mmol/L    Co2 26 20 - 33 mmol/L    Glucose 125 (H) 65 - 99 mg/dL    Bun 24 (H) 8 - 22 mg/dL    Creatinine 1.10 0.50 - 1.40 mg/dL    Calcium 9.5 8.5 - 10.5 mg/dL    Anion Gap 10.0 0.0 - 11.9   CBC WITH  DIFFERENTIAL    Collection Time: 03/03/19  2:39 AM   Result Value Ref Range    WBC 8.2 4.8 - 10.8 K/uL    RBC 4.02 (L) 4.20 - 5.40 M/uL    Hemoglobin 12.3 12.0 - 16.0 g/dL    Hematocrit 38.1 37.0 - 47.0 %    MCV 94.8 81.4 - 97.8 fL    MCH 30.6 27.0 - 33.0 pg    MCHC 32.3 (L) 33.6 - 35.0 g/dL    RDW 48.8 35.9 - 50.0 fL    Platelet Count 179 164 - 446 K/uL    MPV 12.1 9.0 - 12.9 fL    Neutrophils-Polys 54.30 44.00 - 72.00 %    Lymphocytes 33.20 22.00 - 41.00 %    Monocytes 9.70 0.00 - 13.40 %    Eosinophils 2.40 0.00 - 6.90 %    Basophils 0.20 0.00 - 1.80 %    Immature Granulocytes 0.20 0.00 - 0.90 %    Nucleated RBC 0.00 /100 WBC    Neutrophils (Absolute) 4.46 2.00 - 7.15 K/uL    Lymphs (Absolute) 2.73 1.00 - 4.80 K/uL    Monos (Absolute) 0.80 0.00 - 0.85 K/uL    Eos (Absolute) 0.20 0.00 - 0.51 K/uL    Baso (Absolute) 0.02 0.00 - 0.12 K/uL    Immature Granulocytes (abs) 0.02 0.00 - 0.11 K/uL    NRBC (Absolute) 0.00 K/uL   ESTIMATED GFR    Collection Time: 03/03/19  2:39 AM   Result Value Ref Range    GFR If  57 (A) >60 mL/min/1.73 m 2    GFR If Non  47 (A) >60 mL/min/1.73 m 2       Fluids    Intake/Output Summary (Last 24 hours) at 03/03/19 0737  Last data filed at 03/02/19 1200   Gross per 24 hour   Intake              560 ml   Output                0 ml   Net              560 ml       Core Measures & Quality Metrics  Labs reviewed, Medications reviewed and Radiology images reviewed  Grande Catheter: Continue until more alert.      DVT Prophylaxis: Contraindicated - High bleeding risk  DVT prophylaxis - mechanical: SCDs  Ulcer prophylaxis: Yes    Assessed for rehab: Patient unable to tolerate rehabilitation therapeutic regimen    Total Score: 11    ETOH Screening     Reason for no ETOH Intervention: Traumatic Brain Injury (Mental acuity)  ETOH Screening     Intervention complete date: 2/23/2019        Assessment/Plan  Hypokalemia   Assessment & Plan    Replete and trend  Kdur q day  ordered     Palliative care encounter- (present on admission)   Assessment & Plan    2/23 Palliative care consult for advance care planning and POLST completion.     Encounter for geriatric assessment- (present on admission)   Assessment & Plan    2/23 Geriatric consult   Recommendations appreciated and implemented     Traumatic brain injury (HCC)- (present on admission)   Assessment & Plan    Acute subdural vs epidural hemorrhage in the left parietal region with the greatest transverse diameter of 1.2cm causing mild local mass effect  Repeat interval CT imaging unchanged.  Non-operative management.   Post traumatic pharmacologic seizure prophylaxis for 1 week (2/28).  Andrea Alejo MD. Neurosurgery.     Fracture of second cervical vertebra (HCC)- (present on admission)   Assessment & Plan    Nondisplaced oblique fracture mid dens of C2.  Non-operative management.   Aspen collar on at all times for 6 weeks.  Andrea Alejo MD. Neurosurgery.     Discharge planning issues   Assessment & Plan    2/25 Skilled referral placed.  2/26 Pending accepting facility  Admission date  2/22/2019    Transfer from SICU      Rehab/SNF consult 2/25/19     Medically cleared for discharge 2/25/19      Not discharged: 3/3  Reasons:  Delay in Discharge Plan and hospital bed ordered over 72 hours ago still in process  Discharged Date               Family history of Alzheimer's disease- (present on admission)   Assessment & Plan    Premorbid.  Lives with daughter.  Ambulates with walker, has fallen a couple of times due to unsteady gait, able to complete all self care activities.  Normalize sleep/wake cycles.     Contraindication to deep vein thrombosis (DVT) prophylaxis- (present on admission)   Assessment & Plan    Systemic anticoagulation contraindicated secondary to elevated bleeding risk.  RAP score 11.  2/23 Surveillance venous duplex scanning ordered.  2/24 Trauma screening bilateral lower extremity venous duplex negative for above  knee DVT.     Platelet dysfunction due to drugs- (present on admission)   Assessment & Plan    History of ASA and Plavix for TIA.  Admission TEG with platelet mapping demonstrated prolonged AA.  2/22 Transfused 1 unit platelets.  Repeat TEG with 88.8% AA inhibition. Repeat imaging unchanged so no further platelet transfusion.  Hold ASA and Plavix for 6 weeks per neurosurgery.     Scalp laceration- (present on admission)   Assessment & Plan    Right scalp laceration.  Stapled repair completed in ED.  Plan for staple removal at 7 days (3/1).     Trauma- (present on admission)   Assessment & Plan    Ground level fall, unknown LOC.  Trauma Yellow Transfer Activation.  Smiley Collins MD. Trauma Surgery.     Essential hypertension- (present on admission)   Assessment & Plan    Chronic condition treated with Hyzaar.  2/23 Resumed maintenance medication.         Discussed patient condition with RN, Patient and trauma surgery. Dr. Lopez

## 2019-03-03 NOTE — PROGRESS NOTES
Bedside report received from night shift RN, care assumed. Pt assessed, sleeping at this time. Breathing unlabored, no signs of distress at this time. Bed locked in lowest position, bed alarm on. Call light and personal belongings within reach.

## 2019-03-03 NOTE — CARE PLAN
Problem: Knowledge Deficit  Goal: Knowledge of disease process/condition, treatment plan, diagnostic tests, and medications will improve  Outcome: PROGRESSING SLOWER THAN EXPECTED      Problem: Psychosocial Needs:  Goal: Level of anxiety will decrease  Outcome: PROGRESSING SLOWER THAN EXPECTED      Problem: Skin Integrity  Goal: Risk for impaired skin integrity will decrease  Outcome: PROGRESSING SLOWER THAN EXPECTED

## 2019-03-04 VITALS
HEIGHT: 63 IN | WEIGHT: 126.2 LBS | BODY MASS INDEX: 22.36 KG/M2 | RESPIRATION RATE: 16 BRPM | HEART RATE: 88 BPM | DIASTOLIC BLOOD PRESSURE: 71 MMHG | SYSTOLIC BLOOD PRESSURE: 125 MMHG | TEMPERATURE: 97.8 F | OXYGEN SATURATION: 95 %

## 2019-03-04 LAB
ANION GAP SERPL CALC-SCNC: 11 MMOL/L (ref 0–11.9)
BASOPHILS # BLD AUTO: 0.5 % (ref 0–1.8)
BASOPHILS # BLD: 0.04 K/UL (ref 0–0.12)
BUN SERPL-MCNC: 27 MG/DL (ref 8–22)
CALCIUM SERPL-MCNC: 10.3 MG/DL (ref 8.5–10.5)
CHLORIDE SERPL-SCNC: 106 MMOL/L (ref 96–112)
CO2 SERPL-SCNC: 22 MMOL/L (ref 20–33)
CREAT SERPL-MCNC: 1.19 MG/DL (ref 0.5–1.4)
EOSINOPHIL # BLD AUTO: 0.12 K/UL (ref 0–0.51)
EOSINOPHIL NFR BLD: 1.5 % (ref 0–6.9)
ERYTHROCYTE [DISTWIDTH] IN BLOOD BY AUTOMATED COUNT: 49.2 FL (ref 35.9–50)
GLUCOSE SERPL-MCNC: 120 MG/DL (ref 65–99)
HCT VFR BLD AUTO: 39.9 % (ref 37–47)
HGB BLD-MCNC: 13 G/DL (ref 12–16)
IMM GRANULOCYTES # BLD AUTO: 0.02 K/UL (ref 0–0.11)
IMM GRANULOCYTES NFR BLD AUTO: 0.3 % (ref 0–0.9)
LYMPHOCYTES # BLD AUTO: 2.42 K/UL (ref 1–4.8)
LYMPHOCYTES NFR BLD: 31.1 % (ref 22–41)
MAGNESIUM SERPL-MCNC: 2.2 MG/DL (ref 1.5–2.5)
MCH RBC QN AUTO: 31.2 PG (ref 27–33)
MCHC RBC AUTO-ENTMCNC: 32.6 G/DL (ref 33.6–35)
MCV RBC AUTO: 95.7 FL (ref 81.4–97.8)
MONOCYTES # BLD AUTO: 0.7 K/UL (ref 0–0.85)
MONOCYTES NFR BLD AUTO: 9 % (ref 0–13.4)
NEUTROPHILS # BLD AUTO: 4.48 K/UL (ref 2–7.15)
NEUTROPHILS NFR BLD: 57.6 % (ref 44–72)
NRBC # BLD AUTO: 0 K/UL
NRBC BLD-RTO: 0 /100 WBC
PHOSPHATE SERPL-MCNC: 3.4 MG/DL (ref 2.5–4.5)
PLATELET # BLD AUTO: 172 K/UL (ref 164–446)
PMV BLD AUTO: 11.5 FL (ref 9–12.9)
POTASSIUM SERPL-SCNC: 3.7 MMOL/L (ref 3.6–5.5)
RBC # BLD AUTO: 4.17 M/UL (ref 4.2–5.4)
SODIUM SERPL-SCNC: 139 MMOL/L (ref 135–145)
WBC # BLD AUTO: 7.8 K/UL (ref 4.8–10.8)

## 2019-03-04 PROCEDURE — 80048 BASIC METABOLIC PNL TOTAL CA: CPT

## 2019-03-04 PROCEDURE — 84100 ASSAY OF PHOSPHORUS: CPT

## 2019-03-04 PROCEDURE — 36415 COLL VENOUS BLD VENIPUNCTURE: CPT

## 2019-03-04 PROCEDURE — 83735 ASSAY OF MAGNESIUM: CPT

## 2019-03-04 PROCEDURE — 700102 HCHG RX REV CODE 250 W/ 637 OVERRIDE(OP): Performed by: NURSE PRACTITIONER

## 2019-03-04 PROCEDURE — A9270 NON-COVERED ITEM OR SERVICE: HCPCS | Performed by: NURSE PRACTITIONER

## 2019-03-04 PROCEDURE — 97530 THERAPEUTIC ACTIVITIES: CPT

## 2019-03-04 PROCEDURE — 85025 COMPLETE CBC W/AUTO DIFF WBC: CPT

## 2019-03-04 PROCEDURE — 97535 SELF CARE MNGMENT TRAINING: CPT

## 2019-03-04 PROCEDURE — A9270 NON-COVERED ITEM OR SERVICE: HCPCS | Performed by: INTERNAL MEDICINE

## 2019-03-04 PROCEDURE — 700102 HCHG RX REV CODE 250 W/ 637 OVERRIDE(OP): Performed by: INTERNAL MEDICINE

## 2019-03-04 RX ADMIN — POTASSIUM CHLORIDE 20 MEQ: 1500 TABLET, EXTENDED RELEASE ORAL at 06:00

## 2019-03-04 RX ADMIN — ACETAMINOPHEN 1000 MG: 500 TABLET ORAL at 12:12

## 2019-03-04 RX ADMIN — LOSARTAN POTASSIUM 100 MG: 50 TABLET ORAL at 06:00

## 2019-03-04 RX ADMIN — HYDROCHLOROTHIAZIDE 12.5 MG: 25 TABLET ORAL at 06:00

## 2019-03-04 RX ADMIN — ACETAMINOPHEN 1000 MG: 500 TABLET ORAL at 06:00

## 2019-03-04 RX ADMIN — ESCITALOPRAM OXALATE 10 MG: 10 TABLET ORAL at 06:00

## 2019-03-04 ASSESSMENT — COGNITIVE AND FUNCTIONAL STATUS - GENERAL
SUGGESTED CMS G CODE MODIFIER DAILY ACTIVITY: CK
TOILETING: A LOT
EATING MEALS: A LOT
DAILY ACTIVITIY SCORE: 14
PERSONAL GROOMING: A LITTLE
DRESSING REGULAR UPPER BODY CLOTHING: A LITTLE
HELP NEEDED FOR BATHING: A LOT
DRESSING REGULAR LOWER BODY CLOTHING: A LOT

## 2019-03-04 ASSESSMENT — ENCOUNTER SYMPTOMS: FEVER: 0

## 2019-03-04 NOTE — PROGRESS NOTES
Brief Geriatric Note:       Patient seen and examined today.  Discussed care with 2 of her daughters.  Patient has some episodes with what sounds like syncope during position changes.  Family says this is been going on for years with negative workup so far.  They are not concerned.  Patient does not recall event.  Patient was otherwise stable over the weekend.  Discharge pending availability of hospital bed.  Patient was oriented to self.  Able to say days of week forwards, but not backwards.  I confirm with the family that the goal is not to be rehospitalized once the patient left the hospital.    We appreciate being involved in the care of this patient.  Patient likely being discharged today.  Geriatrics will sign off.    Alan Patel M.D.  Geriatric Physician [facility]  Contact: at  M-F 8-5

## 2019-03-04 NOTE — PROGRESS NOTES
2 RN skin check/dermatitis to periarea/incontinent care, barrier wipes and barrier cream each episode of incontinency.

## 2019-03-04 NOTE — PROGRESS NOTES
"Blood pressure 125/71, pulse 88, temperature 36.6 °C (97.8 °F), resp. rate 16, height 1.6 m (5' 3\"), weight 57.2 kg (126 lb 3.2 oz), SpO2 95 %, not currently breastfeeding.      Addendum to DC summary 3/2/19   No overnight events. Pt continued full care.    Pt was awaiting hospital bed, reason for delay of discharge.     "

## 2019-03-04 NOTE — DISCHARGE PLANNING
note:  IMM letter given. Explained to pt, pt signed, copy to pt and to chart.  Daughter's signed IMM.  RN stated ok for wheelchair transport.    Addendum:  Physician Placement Determination completed by VINI and then faxed to [facility].

## 2019-03-04 NOTE — CARE PLAN
Problem: Safety  Goal: Will remain free from injury  Outcome: PROGRESSING AS EXPECTED  Bed alarm, bed position low, call light within reach, treaded footwear, fall risk education

## 2019-03-04 NOTE — PROGRESS NOTES
Trauma / Surgical Daily Progress Note    Date of Service  3/4/2019    Chief Complaint  87 y.o. female admitted 2/22/2019 with Subdural hematoma (HCC)  GLF    Interval Events  Continue to await hospital bed for group home  Will discuss with .    Review of Systems  Review of Systems   Unable to perform ROS: Mental acuity   Constitutional: Negative for fever.   Respiratory:        RA   Gastrointestinal:        + BM 3/3    Genitourinary:        Voiding/incontinent          Vital Signs  Temp:  [35.9 °C (96.6 °F)-36.1 °C (96.9 °F)] 36 °C (96.8 °F)  Pulse:  [81-83] 83  Resp:  [18] 18  BP: (118-150)/() 150/71  SpO2:  [94 %-95 %] 94 %    Physical Exam  Physical Exam   Constitutional: She appears well-developed and well-nourished. She is easily aroused. No distress. Cervical collar in place.   Elderly   HENT:   Head: Normocephalic.   Eyes: Pupils are equal, round, and reactive to light.   Neck:   C-collar   Cardiovascular: Normal rate.    Pulmonary/Chest: Effort normal.   Abdominal: Soft. Bowel sounds are normal. She exhibits no distension.   Musculoskeletal:   Moves all extremities   Neurological: She is easily aroused. GCS eye subscore is 3. GCS verbal subscore is 3. GCS motor subscore is 5.   A & O to self only   Skin: Skin is warm and dry.   Nursing note and vitals reviewed.      Laboratory  Recent Results (from the past 24 hour(s))   Basic Metabolic Panel    Collection Time: 03/04/19  2:50 AM   Result Value Ref Range    Sodium 139 135 - 145 mmol/L    Potassium 3.7 3.6 - 5.5 mmol/L    Chloride 106 96 - 112 mmol/L    Co2 22 20 - 33 mmol/L    Glucose 120 (H) 65 - 99 mg/dL    Bun 27 (H) 8 - 22 mg/dL    Creatinine 1.19 0.50 - 1.40 mg/dL    Calcium 10.3 8.5 - 10.5 mg/dL    Anion Gap 11.0 0.0 - 11.9   CBC WITH DIFFERENTIAL    Collection Time: 03/04/19  2:50 AM   Result Value Ref Range    WBC 7.8 4.8 - 10.8 K/uL    RBC 4.17 (L) 4.20 - 5.40 M/uL    Hemoglobin 13.0 12.0 - 16.0 g/dL    Hematocrit 39.9 37.0 -  47.0 %    MCV 95.7 81.4 - 97.8 fL    MCH 31.2 27.0 - 33.0 pg    MCHC 32.6 (L) 33.6 - 35.0 g/dL    RDW 49.2 35.9 - 50.0 fL    Platelet Count 172 164 - 446 K/uL    MPV 11.5 9.0 - 12.9 fL    Neutrophils-Polys 57.60 44.00 - 72.00 %    Lymphocytes 31.10 22.00 - 41.00 %    Monocytes 9.00 0.00 - 13.40 %    Eosinophils 1.50 0.00 - 6.90 %    Basophils 0.50 0.00 - 1.80 %    Immature Granulocytes 0.30 0.00 - 0.90 %    Nucleated RBC 0.00 /100 WBC    Neutrophils (Absolute) 4.48 2.00 - 7.15 K/uL    Lymphs (Absolute) 2.42 1.00 - 4.80 K/uL    Monos (Absolute) 0.70 0.00 - 0.85 K/uL    Eos (Absolute) 0.12 0.00 - 0.51 K/uL    Baso (Absolute) 0.04 0.00 - 0.12 K/uL    Immature Granulocytes (abs) 0.02 0.00 - 0.11 K/uL    NRBC (Absolute) 0.00 K/uL   Magnesium: Every Monday and Thursday AM    Collection Time: 03/04/19  2:50 AM   Result Value Ref Range    Magnesium 2.2 1.5 - 2.5 mg/dL   Phosphorus: Every Monday and Thursday AM    Collection Time: 03/04/19  2:50 AM   Result Value Ref Range    Phosphorus 3.4 2.5 - 4.5 mg/dL   ESTIMATED GFR    Collection Time: 03/04/19  2:50 AM   Result Value Ref Range    GFR If  52 (A) >60 mL/min/1.73 m 2    GFR If Non  43 (A) >60 mL/min/1.73 m 2       Fluids  No intake or output data in the 24 hours ending 03/04/19 0832    Core Measures & Quality Metrics  Labs reviewed, Medications reviewed and Radiology images reviewed  Grande Catheter: Continue until more alert.      DVT Prophylaxis: Contraindicated - High bleeding risk  DVT prophylaxis - mechanical: SCDs  Ulcer prophylaxis: Yes    Assessed for rehab: Patient unable to tolerate rehabilitation therapeutic regimen    Total Score: 11    ETOH Screening     Reason for no ETOH Intervention: Traumatic Brain Injury (Mental acuity)  ETOH Screening     Intervention complete date: 2/23/2019        Assessment/Plan  Hypokalemia   Assessment & Plan    Replete and trend  Kdur q day ordered     Palliative care encounter- (present on  admission)   Assessment & Plan    2/23 Palliative care consult for advance care planning and POLST completion.     Encounter for geriatric assessment- (present on admission)   Assessment & Plan    2/23 Geriatric consult   Recommendations appreciated and implemented     Traumatic brain injury (HCC)- (present on admission)   Assessment & Plan    Acute subdural vs epidural hemorrhage in the left parietal region with the greatest transverse diameter of 1.2cm causing mild local mass effect  Repeat interval CT imaging unchanged.  Non-operative management.   Post traumatic pharmacologic seizure prophylaxis for 1 week (2/28).  Andrea Alejo MD. Neurosurgery.     Fracture of second cervical vertebra (HCC)- (present on admission)   Assessment & Plan    Nondisplaced oblique fracture mid dens of C2.  Non-operative management.   Aspen collar on at all times for 6 weeks.  Andrea Alejo MD. Neurosurgery.     Discharge planning issues   Assessment & Plan    2/25 Skilled referral placed.  2/26 Pending accepting facility  Admission date  2/22/2019    Transfer from SICU      Rehab/SNF consult 2/25/19     Medically cleared for discharge 2/25/19      Not discharged: 3/4  Reasons:  Delay in Discharge Plan and hospital bed ordered over 72 hours ago still in process  Discharged Date               Family history of Alzheimer's disease- (present on admission)   Assessment & Plan    Premorbid.  Lives with daughter.  Ambulates with walker, has fallen a couple of times due to unsteady gait, able to complete all self care activities.  Normalize sleep/wake cycles.     Contraindication to deep vein thrombosis (DVT) prophylaxis- (present on admission)   Assessment & Plan    Systemic anticoagulation contraindicated secondary to elevated bleeding risk.  RAP score 11.  2/23 Surveillance venous duplex scanning ordered.  2/24 Trauma screening bilateral lower extremity venous duplex negative for above knee DVT.     Platelet dysfunction due to drugs-  (present on admission)   Assessment & Plan    History of ASA and Plavix for TIA.  Admission TEG with platelet mapping demonstrated prolonged AA.  2/22 Transfused 1 unit platelets.  Repeat TEG with 88.8% AA inhibition. Repeat imaging unchanged so no further platelet transfusion.  Hold ASA and Plavix for 6 weeks per neurosurgery.     Scalp laceration- (present on admission)   Assessment & Plan    Right scalp laceration.  Stapled repair completed in ED.  Plan for staple removal at 7 days (3/1).     Trauma- (present on admission)   Assessment & Plan    Ground level fall, unknown LOC.  Trauma Yellow Transfer Activation.  Smiley Collins MD. Trauma Surgery.     Essential hypertension- (present on admission)   Assessment & Plan    Chronic condition treated with Hyzaar.  2/23 Resumed maintenance medication.         Discussed patient condition with RN, Patient and trauma surgery. Dr. Lopez.

## 2019-03-04 NOTE — PROGRESS NOTES
Bedside report received RN to RN with patient. Assuming care [phone].  Patient is asleep in wheelchair with chair alarm in place and on.

## 2019-03-04 NOTE — DISCHARGE PLANNING
note:  Talked to Paulina at [facility] who confirmed that the hospital bed has been delivered. Paulina agreeable to accept the pt. Transport form faxed to [facility]. Request  at 1pm.

## 2019-03-04 NOTE — DISCHARGE PLANNING
Anticipated Discharge Disposition:       Action:   Talked to Paulina at [facility] who confirmed that she will take pt today however, she is still waiting for the hospital bed. She confirmed that [facility] called her already and ready to deliver the hospital bed this morning. So Paulina is going to call CM once the bed is delivered and we can discharge pt.     Barriers to Discharge:   Pending bed delivery    Plan:   Wait for Paulina at [facility] to call CM back.     Addendum:   Received a call from Marissa (daughter- @ [phone]) who confirmed that they want pt to go to [facility].

## 2019-03-04 NOTE — CARE PLAN
Problem: Skin Integrity  Goal: Risk for impaired skin integrity will decrease  Outcome: PROGRESSING AS EXPECTED  Pericare provided each episode of incontinency, turn and repositioning frequently,

## 2019-03-04 NOTE — DISCHARGE INSTRUCTIONS
Discharge Instructions    Discharged to home by car with relative. Discharged via wheelchair, hospital escort: Yes.  Special equipment needed: Not Applicable    Be sure to schedule a follow-up appointment with your primary care doctor or any specialists as instructed.     Discharge Plan:   Diet Plan: Discussed  Activity Level: Discussed  Confirmed Follow up Appointment: Patient to Call and Schedule Appointment  Confirmed Symptoms Management: Discussed  Medication Reconciliation Updated: Yes  Pneumococcal Vaccine Administered/Refused: Given (See MAR)  Influenza Vaccine Indication: Not indicated: Previously immunized this influenza season and > 8 years of age    I understand that a diet low in cholesterol, fat, and sodium is recommended for good health. Unless I have been given specific instructions below for another diet, I accept this instruction as my diet prescription.   Other diet: nectar thick    Special Instructions: None    · Is patient discharged on Warfarin / Coumadin?   No     Depression / Suicide Risk    As you are discharged from this [facility] facility, it is important to learn how to keep safe from harming yourself.    Recognize the warning signs:  · Abrupt changes in personality, positive or negative- including increase in energy   · Giving away possessions  · Change in eating patterns- significant weight changes-  positive or negative  · Change in sleeping patterns- unable to sleep or sleeping all the time   · Unwillingness or inability to communicate  · Depression  · Unusual sadness, discouragement and loneliness  · Talk of wanting to die  · Neglect of personal appearance   · Rebelliousness- reckless behavior  · Withdrawal from people/activities they love  · Confusion- inability to concentrate     If you or a loved one observes any of these behaviors or has concerns about self-harm, here's what you can do:  · Talk about it- your feelings and reasons for harming yourself  · Remove any means that  you might use to hurt yourself (examples: pills, rope, extension cords, firearm)  · Get professional help from the community (Mental Health, Substance Abuse, psychological counseling)  · Do not be alone:Call your Safe Contact- someone whom you trust who will be there for you.  · Call your local CRISIS HOTLINE 134-9323 or 811-792-9630  · Call your local Children's Mobile Crisis Response Team [place] (232) 217-8356 or www.[employer]  · Call the toll free National Suicide Prevention Hotlines   · National Suicide Prevention Lifeline 650-404-MIEY (3070)  · National Hope Line Network 800-SUICIDE (808-1308)

## 2019-03-04 NOTE — DISCHARGE PLANNING
Agency/Facility Name: [facility]   Spoke To: SKYE Jay   Outcome: Hospital bed delivered to group home.

## 2019-03-04 NOTE — DISCHARGE PLANNING
Agency/Facility Name: [facility]  Spoke To: Dana   Outcome: [facility] called [facility] regarding delivery of bed to group home. Informed Dana of the events that took place over the weekend. Dana stated they needed to contact the patient regarding the delivery of bed to see if room is cleared in the home. [facility] informed Dana this information was all passed to [facility] on Friday regarding the patient now going to a group home. Dana informed that their staff was already notified of who to speak with at the  on Friday. Dana put [facility] on hold to review the case. After reviewing Dana stated they would be calling Paulina ( owner) this morning to make sure bed was delivered today. [facility] informed Dana that a follow up call will be made to make sure delivery will be made today.      SKYE Jay notified of update.

## 2019-03-04 NOTE — CARE PLAN
Problem: Safety  Goal: Will remain free from injury  Outcome: PROGRESSING AS EXPECTED  Require extensive 2 assist with transfer, safety precaution in place (bed alarm/chair alarm), frequent visual check.

## 2019-03-04 NOTE — THERAPY
"Occupational Therapy Treatment completed with focus on ADLs, ADL transfers and patient education.  Functional Status:  Pt seen for OT tx. Up in chair upon arrival. Mod A sit > stand, total A for pericare d/t incontinence. Pt verbalizing and required extra time to complete tasks. Delayed responses w/ fixed gaze. RN present during session. Rapid called. Max A transfer from chair > BTB. RN and staff took over for rapid call. Brace not fitting pt properly. Attempted to adjust but rapid called. RN aware and present throughout session.   Plan of Care: Will benefit from Occupational Therapy 3 times per week  Discharge Recommendations:  Equipment Will Continue to Assess for Equipment Needs.   See \"Rehab Therapy-Acute\" Patient Summary Report for complete documentation.   "

## 2019-03-04 NOTE — DISCHARGE PLANNING
Received Transport Form at 1030.  Spoke to Khoi at [facility]   Transport is scheduled for 3/4 at 1300 going to [facility].    RN FELICIA Jay notified of transport time.

## 2019-03-05 NOTE — DISCHARGE PLANNING
note:  Received a call from caregiver Paulina asking for home meds because family told her that they do not have any of pt's medication at home.     VINI said that there are no new medications and they are all home meds.     Called Agustín (son-in-law)  of [facility] who pt was living with. He confirmed that they misunderstood the caregiver and the discharge instructions. He confirmed that they have the medications and will deliver to the caregiver tonight.

## 2019-03-30 NOTE — DISCHARGE SUMMARY
Trauma Discharge Summary    DATE OF ADMISSION: 2/22/2019    DATE OF DISCHARGE: 3/4/2019    LENGTH OF STAY: eleven day stay    ATTENDING PHYSICIAN: Dr. Collins    CONSULTING PHYSICIAN:   1. Dr. Mcclure Neurosurgery  2. Dr. Driver  Geriatrics  3. Oracio Roberts  palliative      DISCHARGE DIAGNOSIS:  1. Trauma trauma yellow transfer, GLF  2. Scalp Laceration  3. Non displaced oblique fracture mid dens of C2  4. Traumatic brain injury, subdural vs epidural  5. History stroke    PROCEDURES:  No procedures during this hospital course    HISTORY OF PRESENT ILLNESS: The patient is a 87 y.o. female involved in a GLF. Ms Hearn was subsequently transferred to [facility] for definite trauma care. Pt was triaged as a Trauma yellow transfer.     HOSPITAL COURSE: On arrival, Ms. Hearn was met by the ER physician, Dr. RENATO Stout and the trauma team, Dr. TANYA Collins and Christianne MARTINI.  Primary and secondary surveys were completed with GCS of 14.  Pt was stabilized in the trauma bay.  Outside imaging was reveiwed and pt was admitted to the SICU. Injuries noted on outside imaging, Subdural hemmorage and C2 dens type II fracture.  Neurosurgery consult was obtained recommending continuous C-collar and repeat CT head in four hours. Platelets recommeneded for increasing size of head bleed, due to pt on plavix. Scalp laceration noted on admission was repaired in the ER.    Hospital day #2 geriatrics and Palliative care consults placed, pt was also cleared for transfer to neurosurgery unit.  Placement was initiated.   Hospital day #7 pt was referred to group home and home health ordered.  Plan for transfer to group home in 24 hours.   Late request from group home for [employer]ifer[employer] and awaiting hospital bed delayed discharge to hospital day #7.   3/4/19 pt was transferred to Group home.    DISCHARGE PHYSICAL EXAM: See epic physical exam dated 3/4/2019  Physical Exam   Constitutional: She appears well-developed and  well-nourished. She is easily aroused. No distress. Cervical collar in place.   Elderly   HENT:   Head: Normocephalic.   Eyes: Pupils are equal, round, and reactive to light.   Neck:   C-collar   Cardiovascular: Normal rate.    Pulmonary/Chest: Effort normal.   Abdominal: Soft. Bowel sounds are normal. She exhibits no distension.   Musculoskeletal:   Moves all extremities   Neurological: She is easily aroused. GCS eye subscore is 3. GCS verbal subscore is 3. GCS motor subscore is 5.   A & O to self only   Skin: Skin is warm and dry.   Nursing note and vitals reviewed  DISCHARGE MEDICATIONS:  I reviewed the patients controlled substance history and obtained a controlled substance use informed consent (if applicable) provided by [facility] and the patient has been prescribed.       Medication List      START taking these medications      Instructions   acetaminophen 650 MG Supp  Commonly known as:  TYLENOL   Insert 1 Suppository in rectum every 6 hours as needed.  Dose:  650 mg        CONTINUE taking these medications      Instructions   calcitonin (salmon) 200 UNIT/ACT Soln  Commonly known as:  MIACALCIN   Spray 1 Spray in nose every day.  Dose:  1 Spray     escitalopram 10 MG Tabs  Commonly known as:  LEXAPRO   Take 10 mg by mouth every day.  Dose:  10 mg     NAMENDA 5 MG Tabs  Generic drug:  memantine   Take 5 mg by mouth every day.  Dose:  5 mg        STOP taking these medications    clopidogrel 75 MG Tabs  Commonly known as:  PLAVIX            DISPOSITION: Group home with palliative care.     The  family has been extensively counseled and all questions have been answered. Special attention was paid to respiratory decompensation,  and signs and symptoms of infection and to seek immediate medical attention if these develop. The patient demonstrates understanding and gives verbal compliance with discharge instructions.    TIME SPENT ON DISCHARGE: 55  minutes      ____________________________________________  REGLA Lawrence    DD: 3/30/2019 10:04 AM

## 2020-06-01 ENCOUNTER — APPOINTMENT (OUTPATIENT)
Dept: RADIOLOGY | Facility: MEDICAL CENTER | Age: 85
End: 2020-06-01
Attending: EMERGENCY MEDICINE
Payer: MEDICARE

## 2020-06-01 ENCOUNTER — HOSPITAL ENCOUNTER (EMERGENCY)
Facility: MEDICAL CENTER | Age: 85
End: 2020-06-01
Attending: EMERGENCY MEDICINE
Payer: MEDICARE

## 2020-06-01 VITALS
RESPIRATION RATE: 20 BRPM | DIASTOLIC BLOOD PRESSURE: 79 MMHG | TEMPERATURE: 97 F | OXYGEN SATURATION: 96 % | BODY MASS INDEX: 21.43 KG/M2 | SYSTOLIC BLOOD PRESSURE: 134 MMHG | WEIGHT: 121 LBS | HEART RATE: 86 BPM

## 2020-06-01 DIAGNOSIS — S12.112A CLOSED NONDISPLACED ODONTOID FRACTURE WITH TYPE II MORPHOLOGY, INITIAL ENCOUNTER (HCC): ICD-10-CM

## 2020-06-01 DIAGNOSIS — S09.90XA CLOSED HEAD INJURY, INITIAL ENCOUNTER: ICD-10-CM
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
## 2020-06-01 DIAGNOSIS — S01.81XA FACIAL LACERATION, INITIAL ENCOUNTER: ICD-10-CM

## 2020-06-01 PROCEDURE — 70450 CT HEAD/BRAIN W/O DYE: CPT

## 2020-06-01 PROCEDURE — 303747 HCHG EXTRA SUTURE

## 2020-06-01 PROCEDURE — 72125 CT NECK SPINE W/O DYE: CPT

## 2020-06-01 PROCEDURE — 304217 HCHG IRRIGATION SYSTEM

## 2020-06-01 PROCEDURE — 304999 HCHG REPAIR-SIMPLE/INTERMED LEVEL 1

## 2020-06-01 PROCEDURE — 99284 EMERGENCY DEPT VISIT MOD MDM: CPT

## 2020-06-01 PROCEDURE — 700101 HCHG RX REV CODE 250: Performed by: EMERGENCY MEDICINE

## 2020-06-01 RX ORDER — LIDOCAINE HYDROCHLORIDE AND EPINEPHRINE BITARTRATE 20; .01 MG/ML; MG/ML
10 INJECTION, SOLUTION SUBCUTANEOUS ONCE
Status: COMPLETED | OUTPATIENT
Start: 2020-06-01 | End: 2020-06-01

## 2020-06-01 RX ADMIN — LIDOCAINE HYDROCHLORIDE AND EPINEPHRINE 10 ML: 20; 10 INJECTION, SOLUTION INFILTRATION; PERINEURAL at 08:19

## 2020-06-01 ASSESSMENT — FIBROSIS 4 INDEX: FIB4 SCORE: 4.37

## 2020-06-01 NOTE — DISCHARGE INSTRUCTIONS
Follow-up with primary care or facility physician 2 days for reevaluation and wound check, as well as medication management and close blood pressure monitoring.  Follow-up in 5 to 7 days with primary care or emergency department for suture removal.  Follow-up with neurosurgery for further evaluation of chronic C2 vertebral fracture.    Continue any home medications as previously indicated.    Keep wound clean, dry and intact.  Cleanse gently with warm water, soap, pat dry.  Avoid soaking wound in water.  Apply antibiotic ointment twice daily.    Return to the emergency department for altered mental status, recurrent falls, seizure, focal weakness, vomiting or other new pain or concerns.

## 2020-06-01 NOTE — DISCHARGE PLANNING
Medical Social Work    LSW was notified that pt is medically clear to return to [facility] ([phone]). LSW called and confirmed that pt can return to her room 226. LSW transferred SNF RN to pt's bedside RN for a nurse to nurse.     LSW completed PCS and faxed PCS and Facesheet to [facility]. Transport arranged w/ Jay at [facility] for pt who has dementia and hx of TIA. Transport arranged for 0945. Bedside RN notified.

## 2020-06-01 NOTE — ED PROVIDER NOTES
"ED Provider Note    CHIEF COMPLAINT  Chief Complaint   Patient presents with   • T-5000 GLF     Pt had a MGLF approx 1 hour ago with noted head trauma. -LOC. HX alzheimers, pt at baseline per living facility. GCS 14 at this time. Pt TBI pre-alert to imaging.        HPI  Shirley Hearn is a 88 y.o. female who presents to the emergency department by ambulance from skilled nursing facility after mechanical ground-level fall.  Unwitnessed, patient currently took a fall in her room, found on the ground.  No reported LOC but this is really unknown.  Patient with history of Alzheimer's, ANO x2 at baseline.  Patient with wound/laceration to her forehead.  Baseline unchanged.  No vomiting.  No focal weakness.  Patient complaining of some right breast pain for EMS.    Additionally, patient apparently with recent right hip fracture, nonoperative, but mostly nonambulatory at this time.    REVIEW OF SYSTEMS  See HPI for further details. All other systems are negative.     PAST MEDICAL HISTORY   has a past medical history of Alzheimer disease (HCC), Chronic kidney disease, Femur fracture, right (HCC), Hypertension, Psychiatric disorder, Rheumatoid arthritis(714.0), and Schizoaffective disorder (HCC).    SOCIAL HISTORY  Social History     Tobacco Use   • Smoking status: Former Smoker   • Tobacco comment: quit \"years ago\"   Substance and Sexual Activity   • Alcohol use: No     Comment: occasional   • Drug use: No   • Sexual activity: Not on file       SURGICAL HISTORY   has a past surgical history that includes hysterectomy radical; other orthopedic surgery; and tonsillectomy.    CURRENT MEDICATIONS  Home Medications    **Home medications have not yet been reviewed for this encounter**         ALLERGIES  Allergies   Allergen Reactions   • Sulfa Drugs        PHYSICAL EXAM  VITAL SIGNS: /55   Pulse 97   Temp 37 °C (98.6 °F) (Temporal)   Resp 20   Wt 54.9 kg (121 lb)   SpO2 97%   BMI 21.43 kg/m²   Pulse ox interpretation: " I interpret this pulse ox as normal.  Constitutional: Alert in no apparent distress.  HENT: Normocephalic, no cephalohematoma although patient does have a 3 cm forehead laceration. Bilateral external ears normal, Nose normal. Moist mucous membranes.  No oral trauma.  Eyes: Pupils are equal and reactive, Conjunctiva normal.   Neck: No midline step-off.  Normal range of motion.  Supple.  Lymphatic: No lymphadenopathy noted.   Cardiovascular: Regular rate and rhythm, no murmurs. Distal pulses intact.    Thorax & Lungs: Normal breath sounds.  No wheezing/rales/ronchi. No increased work of breathing, clipped speech or retractions.  No chest wall tenderness, crepitus, abrasion or hematoma.  Abdomen: Soft, non-distended, non-tender to palpation.   Skin: Warm, Dry, No erythema, No rash.   Musculoskeletal: Good range of motion in all major joints. No major deformities noted.   Neurologic: Alert and oriented to self and place.  Moves 4 extremities on command and without focality.  Psychiatric: Flat affect.  Cooperative.  History of Alzheimer's.      DIAGNOSTIC STUDIES / PROCEDURES    RADIOLOGY  CT-CSPINE WITHOUT PLUS RECONS   Final Result         1.  No acute traumatic bony injury of the cervical spine is apparent.   2.  Remote type II dens fracture with nonunion, likely unstable.   3.  Atherosclerosis      CT-HEAD W/O   Final Result         1.  No acute intracranial abnormality is identified, there are nonspecific white matter changes, commonly associated with small vessel ischemic disease.  Associated mild cerebral atrophy is noted.   2.  Atherosclerosis.        PROCEDURE  LACERATION REPAIR PROCEDURE NOTE  The patient's identification was confirmed and consent was obtained.  This procedure was performed by Dr. Vincent  Site: forehead  Sterile procedures observed   Anesthetic used (type and amt): 3 cc lidocaine with epinephrine  Suture type/size: 5-0 nylon  Length: 3 cm  # of Sutures: 4  Technique: Simple  interrupted  Complexity: Simple  Antibx ointment applied  Site anesthetized, irrigated with NS, explored without evidence of foreign body, wound well approximated, site covered with dry, sterile dressing. Patient tolerated procedure well without complications. Instructions for care discussed verbally and patient provided with additional written instructions for homecare and f/u.    COURSE & MEDICAL DECISION MAKING  Nursing notes and vital signs were reviewed. (See chart for details)  The patients records were reviewed, history was obtained from the patient and EMS;    Patient seen evaluated immediately upon arrival at charge as per TBI protocol.  Elderly female, no history for anticoagulation use, reportedly at baseline now.    5:08 AM CT C-spine read above confirmed with radiology.  Chronic nonunion fracture likely unstable.  Neurosurgery paged for recommendations.  Otherwise no acute trauma.    0515 -Dr. Vo is aware of patient presentation and CT findings.  If without any evidence for neck pain, and patients with Alzheimer's and dementia can still have pain, no indication for further intervention nor cervical collar at this time.  He is more than willing to see patient in the outpatient setting for further evaluation.    Patient remains at reported baseline.  No acute distress.  No altered mental status.  Hemodynamically stable.  Forehead laceration was repaired as described above with good hemostasis and approximation.    Patient is stable for discharge at this time, anticipatory guidance provided, close follow-up is encouraged, and strict ED return instructions have been detailed. Patient is agreeable to the disposition and plan.    Patient's blood pressure was elevated in the emergency department, and has been referred to primary care for close monitoring.      FINAL IMPRESSION  (W19.XXXA) Fall, initial encounter  (S09.90XA) Closed head injury, initial encounter  (S01.81XA) Facial laceration, initial  encounter  (S12.112A) Closed nondisplaced odontoid fracture with type II morphology, initial encounter ([facility]), CHRONIC      Electronically signed by: Svitlana Vincent D.O., 6/1/2020 4:38 AM      This dictation was created using voice recognition software. The accuracy of the dictation is limited to the abilities of the software. I expect there may be some errors of grammar and possibly content. The nursing notes were reviewed and certain aspects of this information were incorporated into this note.

## 2020-06-01 NOTE — ED NOTES
Pt resting, breathing equal and unlabored, in no acute distress. Report given to Danielle CHEUNG.

## 2020-06-01 NOTE — ED NOTES
Received pt to roge Man from CT, agree with triage assessment. Pt with oozing laceration to right forehead, pt denies pain anywhere, neuro at baseline. Pt on monitor, positioned to right side for comfort, denies other needs, await dispo. Per ERP, hold off on IV and labs at this time.

## 2020-06-01 NOTE — ED TRIAGE NOTES
Shirley Hearn  88 y.o. female  Chief Complaint   Patient presents with   • T-5000 GLF     Pt had a MGLF approx 1 hour ago with noted head trauma. -LOC. HX alzheimers, pt at baseline per living facility. GCS 14 at this time. Pt TBI pre-alert to imaging.        Pt is alert and oriented to baseline and responds appropriately to questions. Resp are even and unlabored. No behavioral indicators of pain.

## 2020-06-01 NOTE — DISCHARGE PLANNING
Medical Social Work    Referral: TBI    Intervention: Pt is an 88 year old female brought in by [facility] from [facility] after a GLF.  Pt is Shirley Hearn (: 1931).  Per records from facility pt's emergency contact is her daughter, Jacy ([phone]).    Plan: SW will follow as needed.

## 2021-01-15 DIAGNOSIS — Z23 NEED FOR VACCINATION: ICD-10-CM

## 2022-01-26 ENCOUNTER — HOSPITAL ENCOUNTER (EMERGENCY)
Facility: MEDICAL CENTER | Age: 87
End: 2022-01-26
Attending: EMERGENCY MEDICINE
Payer: MEDICARE

## 2022-01-26 ENCOUNTER — APPOINTMENT (OUTPATIENT)
Dept: RADIOLOGY | Facility: MEDICAL CENTER | Age: 87
End: 2022-01-26
Attending: EMERGENCY MEDICINE
Payer: MEDICARE

## 2022-01-26 VITALS
OXYGEN SATURATION: 95 % | HEART RATE: 91 BPM | RESPIRATION RATE: 16 BRPM | BODY MASS INDEX: 21.44 KG/M2 | WEIGHT: 121 LBS | SYSTOLIC BLOOD PRESSURE: 145 MMHG | DIASTOLIC BLOOD PRESSURE: 74 MMHG | HEIGHT: 63 IN | TEMPERATURE: 97.9 F

## 2022-01-26 DIAGNOSIS — M25.551 ARTHRALGIA OF HIP OR THIGH, RIGHT: ICD-10-CM

## 2022-01-26 PROCEDURE — 99285 EMERGENCY DEPT VISIT HI MDM: CPT

## 2022-01-26 PROCEDURE — 73502 X-RAY EXAM HIP UNI 2-3 VIEWS: CPT | Mod: RT

## 2022-01-26 ASSESSMENT — LIFESTYLE VARIABLES: DO YOU DRINK ALCOHOL: NO

## 2022-01-26 NOTE — ED TRIAGE NOTES
Pt arrives to ED from [facility] for pt c/o of RLQ pain x 5 days. Upon arrival pt is not complaing of any pain. Pt is GCS 14 which is baseline for her. Pt Has hx of right hip sx but no recent trauma.

## 2022-01-26 NOTE — ED PROVIDER NOTES
"ED Provider Note    CHIEF COMPLAINT  Chief Complaint   Patient presents with   • RLQ Pain       HPI  Shirley Hearn is a 89 y.o. female who presents valuation of some right hip pain.  Contrary to initial triage note the patient does not endorse or report any abdominal pain she reports some pain with the right hip.  She has a history of some chronic arthritis.  There is no report of any fall or head injury.  Patient is at a memory care facility due to underlying Alzheimer's disease.  She is not on any blood thinners by report.  No report of injury to the head neck chest abdomen or pelvis    REVIEW OF SYSTEMS  See HPI for further details.  No productive cough high fevers or chills all other systems are negative.     PAST MEDICAL HISTORY  Past Medical History:   Diagnosis Date   • Alzheimer disease (HCC)    • Chronic kidney disease    • Femur fracture, right (HCC)    • Hypertension    • Psychiatric disorder     bipolar disorder, anxiety, violent behavior   • Rheumatoid arthritis(714.0)    • Schizoaffective disorder (HCC)        FAMILY HISTORY  Noncontributory    SOCIAL HISTORY  Social History     Socioeconomic History   • Marital status:      Spouse name: Not on file   • Number of children: Not on file   • Years of education: Not on file   • Highest education level: Not on file   Occupational History   • Not on file   Tobacco Use   • Smoking status: Former Smoker   • Smokeless tobacco: Not on file   • Tobacco comment: quit \"years ago\"   Vaping Use   • Vaping Use: Unknown   Substance and Sexual Activity   • Alcohol use: No     Comment: occasional   • Drug use: No   • Sexual activity: Not on file   Other Topics Concern   • Not on file   Social History Narrative   • Not on file     Social Determinants of Health     Financial Resource Strain:    • Difficulty of Paying Living Expenses: Not on file   Food Insecurity:    • Worried About Running Out of Food in the Last Year: Not on file   • Ran Out of Food in the Last " "Year: Not on file   Transportation Needs:    • Lack of Transportation (Medical): Not on file   • Lack of Transportation (Non-Medical): Not on file   Physical Activity:    • Days of Exercise per Week: Not on file   • Minutes of Exercise per Session: Not on file   Stress:    • Feeling of Stress : Not on file   Social Connections:    • Frequency of Communication with Friends and Family: Not on file   • Frequency of Social Gatherings with Friends and Family: Not on file   • Attends Baptism Services: Not on file   • Active Member of Clubs or Organizations: Not on file   • Attends Club or Organization Meetings: Not on file   • Marital Status: Not on file   Intimate Partner Violence:    • Fear of Current or Ex-Partner: Not on file   • Emotionally Abused: Not on file   • Physically Abused: Not on file   • Sexually Abused: Not on file   Housing Stability:    • Unable to Pay for Housing in the Last Year: Not on file   • Number of Places Lived in the Last Year: Not on file   • Unstable Housing in the Last Year: Not on file       SURGICAL HISTORY  Past Surgical History:   Procedure Laterality Date   • HYSTERECTOMY RADICAL     • OTHER ORTHOPEDIC SURGERY      bilat knees   • TONSILLECTOMY         CURRENT MEDICATIONS  No current facility-administered medications for this encounter.    Current Outpatient Medications:   •  acetaminophen (TYLENOL) 650 MG Suppos, Insert 1 Suppository in rectum every 6 hours as needed., Disp: , Rfl: 0  •  escitalopram (LEXAPRO) 10 MG Tab, Take 10 mg by mouth every day., Disp: , Rfl:   •  calcitonin, salmon, (MIACALCIN) 200 UNIT/ACT Solution, Spray 1 Spray in nose every day., Disp: , Rfl:   •  memantine (NAMENDA) 5 MG Tab, Take 5 mg by mouth every day., Disp: , Rfl:     ALLERGIES  Allergies   Allergen Reactions   • Sulfa Drugs        PHYSICAL EXAM  VITAL SIGNS: BP (!) 187/76   Pulse 89   Temp 36.7 °C (98 °F) (Temporal)   Resp 16   Ht 1.6 m (5' 3\")   Wt 54.9 kg (121 lb)   SpO2 99%   BMI 21.43 " kg/m²       Constitutional: Well developed, Well nourished, No acute distress, Non-toxic appearance.   HENT: Normocephalic, Atraumatic, Bilateral external ears normal, Oropharynx moist, No oral exudates, Nose normal.   Eyes: PERRLA, EOMI, Conjunctiva normal, No discharge.   Neck: Normal range of motion, No tenderness, Supple, No stridor.   Cardiovascular: Normal heart rate, Normal rhythm, No murmurs, No rubs, No gallops.   Thorax & Lungs: Normal breath sounds, No respiratory distress, No wheezing, No chest tenderness.   Abdomen: Bowel sounds normal, Soft, No tenderness, No masses, No pulsatile masses.   Skin: Warm, Dry, No erythema, No rash.   Back: No tenderness, No CVA tenderness.   Extremities: Mild pain with range of motion the right hip without shortening no erythema no warmth   neurologic: Pleasantly demented no acute distress, Normal motor function, Normal sensory function, No focal deficits noted.   Psychiatric: Affect normal, Judgment normal, Mood normal.       RADIOLOGY/PROCEDURES  DX-HIP-COMPLETE - UNILATERAL 2+ RIGHT   Final Result      1.  Negative for right hip fracture or other significant abnormality      2.  Facet arthropathy of the lumbar spine            COURSE & MEDICAL DECISION MAKING  Pertinent Labs & Imaging studies reviewed. (See chart for details)  Initially had ordered some basic laboratory studies from triage based on the initial chief complaint but after evaluating the patient she clearly did not have any abdominal discomfort or complaints.  Serial abdominal exams were performed and she has no palpable mass or focal tenderness rebound guarding or rigidity.  She did have some pain with range of motion the right hip radiograph demonstrates some arthropathy but no acute fracture or any other abnormality.  I suspect suspect this is all musculoskeletal in etiology.  She can be discharged back to her memory care facility for ongoing management    FINAL IMPRESSION  1.  Right hip arthralgia          Electronically signed by: Naga Perkins M.D., 1/26/2022 1:32 PM

## 2022-01-26 NOTE — DISCHARGE PLANNING
Pt ready to dc back to facility.     Confirmed with Lucero at [facility] that she is a patient there.     Confirmed [facility] transport for 3:30 pm. Notified SKYE Hamlin.     Spoke with pt's granddaughter Marta and confirmed she is going back to SNF. She asked for update from RN. Notified SK[facility] Hamlin.     Placed cobra packet in pt's chart.

## 2024-09-25 NOTE — PROGRESS NOTES
No Trauma / Surgical Daily Progress Note    Date of Service  2/27/2019    Chief Complaint  87 y.o. female admitted 2/22/2019 with Subdural hematoma (HCC)    Interval Events  No overnight events.  Awaiting SNF acceptance        Review of Systems  Review of Systems   Unable to perform ROS: Mental acuity   Constitutional: Negative for fever.   Gastrointestinal:        Last bowel movement 2/27        Vital Signs  Temp:  [36.7 °C (98.1 °F)-37 °C (98.6 °F)] 36.8 °C (98.2 °F)  Pulse:  [] 104  Resp:  [18-20] 18  BP: (107-161)/(66-87) 107/75  SpO2:  [94 %-100 %] 97 %    Physical Exam  Physical Exam   Constitutional: She appears well-developed and well-nourished. She is easily aroused. No distress. Cervical collar in place.   Elderly   HENT:   Head: Normocephalic.   Eyes: Conjunctivae are normal.   Neck: Neck supple.   Cardiovascular: Normal rate.    Pulmonary/Chest: Effort normal. No respiratory distress.   Abdominal: Soft. Bowel sounds are normal. She exhibits no distension.   Genitourinary:   Genitourinary Comments: Grande to gravity drain-remove today   Musculoskeletal: She exhibits no edema or tenderness.   Moves all extremities   Neurological: She is easily aroused. GCS eye subscore is 3. GCS verbal subscore is 3. GCS motor subscore is 5.   A & O to self only   Skin: Skin is warm and dry. No pallor.   Nursing note and vitals reviewed.      Laboratory  Recent Results (from the past 24 hour(s))   Basic Metabolic Panel    Collection Time: 02/27/19  3:36 AM   Result Value Ref Range    Sodium 137 135 - 145 mmol/L    Potassium 3.2 (L) 3.6 - 5.5 mmol/L    Chloride 107 96 - 112 mmol/L    Co2 23 20 - 33 mmol/L    Glucose 129 (H) 65 - 99 mg/dL    Bun 14 8 - 22 mg/dL    Creatinine 0.95 0.50 - 1.40 mg/dL    Calcium 9.7 8.5 - 10.5 mg/dL    Anion Gap 7.0 0.0 - 11.9   CBC WITH DIFFERENTIAL    Collection Time: 02/27/19  3:36 AM   Result Value Ref Range    WBC 8.3 4.8 - 10.8 K/uL    RBC 3.72 (L) 4.20 - 5.40 M/uL    Hemoglobin 11.5 (L)  12.0 - 16.0 g/dL    Hematocrit 35.8 (L) 37.0 - 47.0 %    MCV 96.2 81.4 - 97.8 fL    MCH 30.9 27.0 - 33.0 pg    MCHC 32.1 (L) 33.6 - 35.0 g/dL    RDW 49.7 35.9 - 50.0 fL    Platelet Count 135 (L) 164 - 446 K/uL    MPV 12.6 9.0 - 12.9 fL    Neutrophils-Polys 65.80 44.00 - 72.00 %    Lymphocytes 21.10 (L) 22.00 - 41.00 %    Monocytes 10.50 0.00 - 13.40 %    Eosinophils 1.60 0.00 - 6.90 %    Basophils 0.50 0.00 - 1.80 %    Immature Granulocytes 0.50 0.00 - 0.90 %    Nucleated RBC 0.00 /100 WBC    Neutrophils (Absolute) 5.46 2.00 - 7.15 K/uL    Lymphs (Absolute) 1.75 1.00 - 4.80 K/uL    Monos (Absolute) 0.87 (H) 0.00 - 0.85 K/uL    Eos (Absolute) 0.13 0.00 - 0.51 K/uL    Baso (Absolute) 0.04 0.00 - 0.12 K/uL    Immature Granulocytes (abs) 0.04 0.00 - 0.11 K/uL    NRBC (Absolute) 0.00 K/uL   ESTIMATED GFR    Collection Time: 02/27/19  3:36 AM   Result Value Ref Range    GFR If African American >60 >60 mL/min/1.73 m 2    GFR If Non  56 (A) >60 mL/min/1.73 m 2       Fluids    Intake/Output Summary (Last 24 hours) at 02/27/19 0733  Last data filed at 02/26/19 1930   Gross per 24 hour   Intake              570 ml   Output              350 ml   Net              220 ml       Core Measures & Quality Metrics  Labs reviewed, Medications reviewed and Radiology images reviewed  Grande Catheter: Continue until more alert.      DVT Prophylaxis: Contraindicated - High bleeding risk  DVT prophylaxis - mechanical: SCDs  Ulcer prophylaxis: Yes    Assessed for rehab: Patient unable to tolerate rehabilitation therapeutic regimen    Total Score: 11    ETOH Screening     Reason for no ETOH Intervention: Traumatic Brain Injury (Mental acuity)  ETOH Screening     Intervention complete date: 2/23/2019        Assessment/Plan  Hypokalemia   Assessment & Plan    Replete and trend  Kdur q day ordered     Palliative care encounter- (present on admission)   Assessment & Plan    2/23 Palliative care consult for advance care planning and  POLST completion.     Encounter for geriatric assessment- (present on admission)   Assessment & Plan    2/23 Geriatric consult   Recommendations appreciated and implemented     Traumatic brain injury (HCC)- (present on admission)   Assessment & Plan    Acute subdural vs epidural hemorrhage in the left parietal region with the greatest transverse diameter of 1.2cm causing mild local mass effect  Repeat interval CT imaging unchanged.  Non-operative management.   Post traumatic pharmacologic seizure prophylaxis for 1 week. (2/28)  Andrea Alejo MD. Neurosurgery.     Fracture of second cervical vertebra (HCC)- (present on admission)   Assessment & Plan    Nondisplaced oblique fracture mid dens of C2.  Non-operative management.   Aspen collar on at all times for 6 weeks.  Andrea Alejo MD. Neurosurgery.     Discharge planning issues   Assessment & Plan    2/25 Skilled referral placed.  2/26 Pending accepting facility      Family history of Alzheimer's disease- (present on admission)   Assessment & Plan    Premorbid.  Lives with daughter.  Ambulates with walker, has fallen a couple of times due to unsteady gait, able to complete all self care activities.  Normalize sleep/wake cycles.     Contraindication to deep vein thrombosis (DVT) prophylaxis- (present on admission)   Assessment & Plan    Systemic anticoagulation contraindicated secondary to elevated bleeding risk.  RAP score 11.  2/23 Surveillance venous duplex scanning ordered.  2/24 Trauma screening bilateral lower extremity venous duplex negative for above knee DVT.     Platelet dysfunction due to drugs- (present on admission)   Assessment & Plan    History of ASA and Plavix for TIA.  Admission TEG with platelet mapping demonstrated prolonged AA.  2/22 Transfused 1 unit platelets.  Repeat TEG with 88.8% AA inhibition. Repeat imaging unchanged so no further platelet transfusion.  Hold ASA and Plavix for 6 weeks per neurosurgery.     Scalp laceration- (present on  admission)   Assessment & Plan    Right scalp laceration.  Stapled repair completed in ED.  Plan for staple removal at 7 days (3/1).     Trauma- (present on admission)   Assessment & Plan    Ground level fall, unknown LOC.  Trauma Yellow Transfer Activation.  Smiley Collins MD. Trauma Surgery.     Essential hypertension- (present on admission)   Assessment & Plan    Chronic condition treated with Hyzaar.  2/23 Resumed maintenance medication.         Discussed patient condition with Patient and trauma surgery. Dr. Lopez